# Patient Record
Sex: FEMALE | Race: WHITE | NOT HISPANIC OR LATINO | Employment: OTHER | URBAN - METROPOLITAN AREA
[De-identification: names, ages, dates, MRNs, and addresses within clinical notes are randomized per-mention and may not be internally consistent; named-entity substitution may affect disease eponyms.]

---

## 2019-01-10 ENCOUNTER — TRANSFERRED RECORDS (OUTPATIENT)
Dept: HEALTH INFORMATION MANAGEMENT | Facility: CLINIC | Age: 64
End: 2019-01-10

## 2019-08-15 ENCOUNTER — TRANSFERRED RECORDS (OUTPATIENT)
Dept: HEALTH INFORMATION MANAGEMENT | Facility: CLINIC | Age: 64
End: 2019-08-15

## 2019-08-20 ENCOUNTER — TRANSFERRED RECORDS (OUTPATIENT)
Dept: HEALTH INFORMATION MANAGEMENT | Facility: CLINIC | Age: 64
End: 2019-08-20
Payer: COMMERCIAL

## 2020-01-21 ENCOUNTER — TRANSFERRED RECORDS (OUTPATIENT)
Dept: HEALTH INFORMATION MANAGEMENT | Facility: CLINIC | Age: 65
End: 2020-01-21

## 2020-02-26 ENCOUNTER — TRANSFERRED RECORDS (OUTPATIENT)
Dept: HEALTH INFORMATION MANAGEMENT | Facility: CLINIC | Age: 65
End: 2020-02-26

## 2020-03-03 ENCOUNTER — TRANSFERRED RECORDS (OUTPATIENT)
Dept: HEALTH INFORMATION MANAGEMENT | Facility: CLINIC | Age: 65
End: 2020-03-03

## 2021-01-19 ENCOUNTER — TRANSFERRED RECORDS (OUTPATIENT)
Dept: HEALTH INFORMATION MANAGEMENT | Facility: CLINIC | Age: 66
End: 2021-01-19

## 2021-01-19 LAB
ALBUMIN (URINE) MG/SPEC: 7.9 MG/L
ALBUMIN/CREATININE RATIO: 11 MG/G CREAT
ALT SERPL-CCNC: 27 IU/L (ref 8–45)
AST SERPL-CCNC: 34 IU/L (ref 2–40)
CHOLEST SERPL-MCNC: 185 MG/DL (ref 100–199)
CREAT SERPL-MCNC: 0.64 MG/DL (ref 0.57–1.11)
CREATININE (URINE): 0.72 G/L
GFR SERPL CREATININE-BSD FRML MDRD: >60 ML/MIN/1.73M2
GLUCOSE SERPL-MCNC: 76 MG/DL (ref 65–100)
HBA1C MFR BLD: 6.3 % (ref 0–5.7)
HDLC SERPL-MCNC: 47 MG/DL
LDLC SERPL CALC-MCNC: 124 MG/DL
NONHDLC SERPL-MCNC: 138 MG/DL
POTASSIUM SERPL-SCNC: 3.9 MMOL/L (ref 3.5–5)
TRIGL SERPL-MCNC: 68 MG/DL

## 2021-01-20 ENCOUNTER — TRANSFERRED RECORDS (OUTPATIENT)
Dept: HEALTH INFORMATION MANAGEMENT | Facility: CLINIC | Age: 66
End: 2021-01-20

## 2021-05-11 ENCOUNTER — TRANSFERRED RECORDS (OUTPATIENT)
Dept: HEALTH INFORMATION MANAGEMENT | Facility: CLINIC | Age: 66
End: 2021-05-11

## 2021-05-26 ENCOUNTER — TRANSFERRED RECORDS (OUTPATIENT)
Dept: HEALTH INFORMATION MANAGEMENT | Facility: CLINIC | Age: 66
End: 2021-05-26

## 2021-06-08 ENCOUNTER — MEDICAL CORRESPONDENCE (OUTPATIENT)
Dept: HEALTH INFORMATION MANAGEMENT | Facility: CLINIC | Age: 66
End: 2021-06-08

## 2021-06-09 ENCOUNTER — REFERRAL (OUTPATIENT)
Dept: TRANSPLANT | Facility: CLINIC | Age: 66
End: 2021-06-09

## 2021-06-09 DIAGNOSIS — K75.81 NASH (NONALCOHOLIC STEATOHEPATITIS): Primary | ICD-10-CM

## 2021-06-09 NOTE — LETTER
Chantelle Sutton  [address] [place] [address]                June 11, 2021          MEDICAL RECORDS REQUEST  [facility] liver transplant team is requesting records from Referring Providers Office for patients referred to the Liver Transplant Program                Images Needed to Process Intake of Patient:    CXR Images (most recent)    Chest CT Images (all within last 24 months or most recent)    Abdominal CT Report and Images  (all within last 24 months or most recent)    Abdominal MRI Report and Images  (all within last 24 months or most recent)    Bone Scan Images and Report (No DEXA Scans)    PET Images and Report  Records Needed to Process Intake of Patient:    Cardiac Catheterization Results (most recent on file)    Chest CT Report (all within last 24 months or most recent)    Chest X-Ray Report (all within last 24 months or most recent)    Culture Results (last 2 years on file)    ECHO Results (most recent on file)    Hospital Discharge Summaries (last 2 years on file)    Lab Results (most recent on file)    History and Physical (original on file)    Liver Pathology All Reports     Physicians Notes (last 3 reports on file)    Radiology Reports (not including Chest X-ray, last 2 years on file)    EGD Images and Report     Colonoscopy Report with Pathology    Requested imaging may be electronically sent to the [employer] imaging system via [employer] DICOM connection.   When unable to send imaging electronically, an exported DICOM CD may be sent. Please indicate when images have been sent electronically on a return faxed cover sheet.    Requested pathology slides should be accompanied by the appropriate report from your institution.    When the patient is hand carrying requested records or the requested records are not at your facility, please indicate this information on a return faxed cover sheet.    Please fax all paper records to [phone] within 3-5 business days.     Please send all scans/slides to:   [facility]  Solid Organ Transplant Office  [address] [facility], [address] [address]    Please call our office at [phone] if you have any questions or concerns.

## 2021-06-11 VITALS — WEIGHT: 260 LBS | HEIGHT: 65 IN | BODY MASS INDEX: 43.32 KG/M2

## 2021-06-11 PROBLEM — K75.81 NASH (NONALCOHOLIC STEATOHEPATITIS): Status: ACTIVE | Noted: 2021-01-19

## 2021-06-11 PROBLEM — D69.6 THROMBOCYTOPENIA (H): Status: ACTIVE | Noted: 2021-01-19

## 2021-06-11 PROBLEM — E11.9 CONTROLLED TYPE 2 DIABETES MELLITUS WITHOUT COMPLICATION, WITH LONG-TERM CURRENT USE OF INSULIN (H): Status: ACTIVE | Noted: 2021-01-19

## 2021-06-11 PROBLEM — Z79.4 CONTROLLED TYPE 2 DIABETES MELLITUS WITHOUT COMPLICATION, WITH LONG-TERM CURRENT USE OF INSULIN (H): Status: ACTIVE | Noted: 2021-01-19

## 2021-06-11 PROBLEM — Z90.710 H/O TOTAL HYSTERECTOMY: Status: ACTIVE | Noted: 2021-01-19

## 2021-06-11 SDOH — HEALTH STABILITY: MENTAL HEALTH: HOW OFTEN DO YOU HAVE 6 OR MORE DRINKS ON ONE OCCASION?: NOT ASKED

## 2021-06-11 SDOH — HEALTH STABILITY: MENTAL HEALTH: HOW MANY STANDARD DRINKS CONTAINING ALCOHOL DO YOU HAVE ON A TYPICAL DAY?: NOT ASKED

## 2021-06-11 SDOH — HEALTH STABILITY: MENTAL HEALTH: HOW OFTEN DO YOU HAVE A DRINK CONTAINING ALCOHOL?: NOT ASKED

## 2021-06-11 ASSESSMENT — MIFFLIN-ST. JEOR: SCORE: 1717.29

## 2021-06-11 NOTE — TELEPHONE ENCOUNTER
Patient was asked the following questions during liver intake call.     Referring Provider: Dr. Kellie Breen   Referring Diagnosis: DINH/Cirrhosis of the Liver   PCP: Dr. Kellie Breen     1)Do you know why you have liver disease: Yes             If Alcoholic Cirrhosis is present when was your last drink: 34+ Years Ago              Have you ever been through treatment for alcohol: No  2) Presence of Ascites: Yes Paracentesis: No  3) Presence of Hepatic Encephalopathy: No Medications: No  4) History of GI Bleeding: Yes  5) Oxygen Use: None  6) EGD: Yes Where:  [facility] When: 2020  7) Colonoscopy: No  8) MELD Score: No recent labs   9) Insurance information: [employer]/Medicare       Policy ma: Self       Subscriber/policy/ID number: [id number]      Group Number: [id number]    Referral intake process completed.  Patient is aware that after financial approval is received, medical records will be requested.   Patient confirmed for a callback from transplant coordinator on 6/17/21.  Tentative evaluation date TBD.    Confirmed coordinator will discuss evaluation process in more detail at the time of their call.   Patient is aware of the need to arrange age appropriate cancer screening, vaccinations, and dental care.  Reminded patient to complete questionnaire, complete medical records release, and review packet prior to evaluation visit .  Assessed patient for special needs (ie--wheelchair, assistance, guardian, and ):  Sometimes uses a cane.   Patient instructed to call [phone] with questions.     Patient gave verbal consent during intake call to obtain medical records and documents outside of [facility]:  Yes     LA Moran, LPN   Solid Organ Transplant

## 2021-06-21 ENCOUNTER — DOCUMENTATION ONLY (OUTPATIENT)
Dept: TRANSPLANT | Facility: CLINIC | Age: 66
End: 2021-06-21

## 2021-06-21 DIAGNOSIS — K74.60 CIRRHOSIS OF LIVER (H): Primary | ICD-10-CM

## 2021-06-21 DIAGNOSIS — R93.89 ABNORMAL FINDINGS ON DIAGNOSTIC IMAGING OF OTHER SPECIFIED BODY STRUCTURES: ICD-10-CM

## 2021-06-21 DIAGNOSIS — Z11.59 ENCOUNTER FOR SCREENING FOR OTHER VIRAL DISEASES: ICD-10-CM

## 2021-06-21 NOTE — Clinical Note
Please schedule consult with hepatology with labs and ultrasound before appt same day- appointment needs to be scheduled between 7/9 and 7/29 as patient is going to be alternating between [place] and [place] for a short period of time; put orders under Linda Burton since she has most availability, but if this is an issue let me know and we can schedule under someone else.. Thanks!

## 2021-06-22 NOTE — PROGRESS NOTES
LIVER DISEASE HERMOSILLO  REFERRING PROVIDER Dr. Kellie Jay, PCP  [facility]  -----------------------------------------------------------------------------------------------------------------------------  MELD 14 >>9  Last labs 6/18/21      ABO unknown    HISTORY OF LIVER DISEASE  History of elevated liver enzymes, dx with HERMOSILLO 4-5 yrs ago but never any sx/ had no issues until hospitalized in [place] while living temporarily there due to family member illness. Hospitalized due to hematemesis May 2021; was following q 6 mos with GI in [place] prior to this pre-covid, then not again until [place] hositalization; Had EGD and banding done at that time- has follow up with GI when returns to [place] at end of June.     Ascites  Mild ascites, dx and therapeutic ascites planned 7/7 by PCP;  On Furosemide;   TIPSno  HE no  Kidney function WNL  Variceal screening see note above  HCC Screening ultrasound in [place], will obtain records  Alcohol use None since [place]os[place] dx    Hospitalizations see ntoe above, first hospitalization for liver cirrhosis mid-May  ---------------------------------------------------------------------------------------------------------------------------------  PMH  DM2, on insulin and metformin; dx with new murmur, referred to cards for echo with bubble study in early July    SHX   lives with spouse.  [place] alternating between [place] and [place], plans to move her sister from [place] up here to [place]- date TBD;  Current residence in [place], [place] expires 7/31, will be staying in [place] while sister recovers from illness with eventual plan to move back to [place] as soon as housing established in [place].  Has follow up EGD to eval banding with GI in [place] at end of June. Adult son also live in [place].   ---------------------------------------------------------------------------------------------------------------------------------  LDLT Discussed NO    PLAN  Consult with Hepatology.  Patient had episode of recent  hospialization with hematemesis, currently experiencing no sx and has follow up with GI in [place]. Repeated labs here locally and MELD down to 9.  Plans to permanently move back to [place], but date to be determined.  WIll be in [place] 7/9-7/31, will plan consult in that time frame.

## 2021-06-24 NOTE — TELEPHONE ENCOUNTER
RECORDS RECEIVED FROM: Internal   Appt Date: 07.09.2021   NOTES STATUS DETAILS   OFFICE NOTE from referring provider Internal 06.21.2021 Marva Rao RN   OFFICE NOTES from other specialists N/A    DISCHARGE SUMMARY from hospital N/A    MEDICATION LIST Care Everywhere    LIVER BIOSPY (IF APPLICABLE)      PATHOLOGY REPORTS  N/A    IMAGING     ENDOSCOPY (IF AVAILABLE) N/A    COLONOSCOPY (IF AVAILABLE) N/A    ULTRASOUND LIVER Care Everywhere 06.15.2021 US ABDOMEN COMPLETE      01.20.2021 US ABDOMEN LIMITED     CT OF ABDOMEN N/A    MRI OF LIVER N/A    FIBROSCAN, US ELASTOGRAPHY, FIBROSIS SCAN, MR ELASTOGRAPHY N/A    LABS     HEPATIC PANEL (LIVER PANEL) Care Everywhere 06.17.2021   BASIC METABOLIC PANEL Care Everywhere 06.17.2021   COMPLETE METABOLIC PANEL Care Everywhere 06.17.2021   COMPLETE BLOOD COUNT (CBC) Care Everywhere 06.17.2021   INTERNATIONAL NORMALIZED RATIO (INR) Care Everywhere 06.17.2021   HEPATITIS C ANTIBODY N/A    HEPATITIS C VIRAL LOAD/PCR N/A    HEPATITIS C GENOTYPE N/A    HEPATITIS B SURFACE ANTIGEN N/A    HEPATITIS B SURFACE ANTIBODY N/A    HEPATITIS B DNA QUANT LEVEL N/A    HEPATITIS B CORE ANTIBODY N/A      Action 06.24.2021 RM   Action Taken Called Padmini to get images pushed, called [phone], pending.     Action 06.25.2021 RM   Action Taken Images received and uploaded to chart.

## 2021-06-27 ENCOUNTER — HEALTH MAINTENANCE LETTER (OUTPATIENT)
Age: 66
End: 2021-06-27

## 2021-07-08 NOTE — H&P (VIEW-ONLY)
"[facility] New Patient Visit    Date of Visit: July 9, 2021    Reason for referral: Consult for decompensated DINH cirrhosis    Subjective: Ms. 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     She was first diagnosed with DINH based on elevated LFTs 4-5 years ago, was referred to GI (Dr. Lynn - [facility]). Went on milk thistle and vitamin E. Reported some scarring on imaging, not sure if she was told she had cirrhosis, but underwent HCC screening every 6 months. Never had a liver bx.     She was in [place] May 2021, felt nauseated and developed hematemesis - underwent EGD that showed EVs that were banded x 6 and GVs that did not have stigmata of bleeding. She had a CT scan at that time - may have had ascites but not enough to tap. MELD apparently 15.     She returned back to [place].     Was started on lasix 20 mg daily a few weeks ago. She underwent her first paracentesis 7/7 - 2 L removed, she is not sure if there was more to remove. Not sent for diagnostic sampling.     Reports she is due for a colonoscopy, has a history of polyps. Last one was 5 years ago.     She is having knee issues, thinks she may need a knee replacement soon.     Weights 251 lbs, was 270 lbs earlier this year.     ROS: 14 point ROS negative except for positives noted in HPI.    PMHx:  Past Medical History:   Diagnosis Date     Diabetes (H)      Hepatitis    DM on insulin and metformin    PSHx:  C section   JACOB - tubes and ovaries removed, has her cervix left    FamHx:  Brother was a drink with cirrhosis, pancreatic cancer  No family history of liver disease, liver cancer    SocHx:  Social History     Socioeconomic History     Marital status:      Spouse name: Not on file     Number of children: Not on file     Years of education: Not on file     Highest education level: Not on file "   Occupational History     Not on file   Social Needs     Financial resource strain: Not on file     Food insecurity     Worry: Not on file     Inability: Not on file     Transportation needs     Medical: Not on file     Non-medical: Not on file   Tobacco Use     Smoking status: Never Smoker     Smokeless tobacco: Never Used   Substance and Sexual Activity     Alcohol use: Not Currently     Comment: Last drink was 30+ years ago     Drug use: Never     Sexual activity: Not on file   Lifestyle     Physical activity     Days per week: Not on file     Minutes per session: Not on file     Stress: Not on file   Relationships     Social connections     Talks on phone: Not on file     Gets together: Not on file     Attends [employer] service: Not on file     Active member of club or organization: Not on file     Attends meetings of clubs or organizations: Not on file     Relationship status: Not on file     Intimate partner violence     Fear of current or ex partner: Not on file     Emotionally abused: Not on file     Physically abused: Not on file     Forced sexual activity: Not on file   Other Topics Concern     Parent/sibling w/ CABG, MI or angioplasty before 65F 55M? Not Asked   Social History Narrative     Not on file    lives with spouse.  [place] and [place], plans to move her sister from [place] up here to [place]- date D when they buy a house;  Current residence in [place], lease expires 7/31, will be staying in [place] while sister recovers from illness with eventual plan to move back to [place] as soon as housing established in [place].    Rare alcohol use in the past    Medications:  Current Outpatient Medications   Medication     aspirin (ASA) 81 MG chewable tablet     biotin 2.5 MG CAPS     blood glucose (ONETOUCH VERIO IQ) test strip     Blood Glucose Monitoring Suppl (ACCU-CHEK GUIDE) w/Device KIT     furosemide (LASIX) 20 MG tablet     insulin pen needle (B-D U/F) 31G X 5 MM miscellaneous     metFORMIN  "(GLUCOPHAGE) 1000 MG tablet     nadolol (CORGARD) 20 MG tablet     pantoprazole (PROTONIX) 40 MG EC tablet     spironolactone (ALDACTONE) 25 MG tablet     ACCU-CHEK GUIDE test strip     B-D U/F insulin pen needle     Blood Glucose Monitoring Suppl (ACCU-CHEK GUIDE) w/Device KIT     cholecalciferol (VITAMIN D3) 1250 mcg (15559 units) capsule     LANTUS SOLOSTAR 100 UNIT/ML soln     No current facility-administered medications for this visit.    Lasix 20 mg daily - started a few weeks ago for LE edema and abdominal swelling  PPI for GERD  Blood sugars ok, A1c good    Allergies:  Allergies   Allergen Reactions     Doxycycline Diarrhea       Objective:  /74   Pulse 64   Temp 97.7  F (36.5  C) (Oral)   Ht 1.638 m (5' 4.5\")   Wt 114.1 kg (251 lb 9.6 oz)   SpO2 97%   BMI 42.52 kg/m    Constitutional: pleasant woman in NAD  Eyes: non icteric  Respiratory: Normal respiratory excursion   MSK: normal range of motion of visualized extremities  Abd: Obese, minimal ascites present  Skin: No jaundice  Psychiatric: normal mood and orientation    Labs:  Last Comprehensive Metabolic Panel:  Sodium   Date Value Ref Range Status   07/09/2021 139 133 - 144 mmol/L Final     Potassium   Date Value Ref Range Status   07/09/2021 3.7 3.4 - 5.3 mmol/L Final     Chloride   Date Value Ref Range Status   07/09/2021 107 94 - 109 mmol/L Final     Carbon Dioxide   Date Value Ref Range Status   07/09/2021 26 20 - 32 mmol/L Final     Anion Gap   Date Value Ref Range Status   07/09/2021 6 3 - 14 mmol/L Final     Glucose   Date Value Ref Range Status   07/09/2021 181 (H) 70 - 99 mg/dL Final     Urea Nitrogen   Date Value Ref Range Status   07/09/2021 13 7 - 30 mg/dL Final     Creatinine   Date Value Ref Range Status   07/09/2021 0.58 0.52 - 1.04 mg/dL Final     GFR Estimate   Date Value Ref Range Status   07/09/2021 >90 >60 mL/min/[1.73_m2] Final     Comment:     Non  GFR Calc  Starting 12/18/2018, serum creatinine based " estimated GFR (eGFR) will be   calculated using the Chronic Kidney Disease Epidemiology Collaboration   (CKD-EPI) equation.       Calcium   Date Value Ref Range Status   07/09/2021 8.6 8.5 - 10.1 mg/dL Final     Bilirubin Total   Date Value Ref Range Status   07/09/2021 0.5 0.2 - 1.3 mg/dL Final     Alkaline Phosphatase   Date Value Ref Range Status   07/09/2021 100 40 - 150 U/L Final     ALT   Date Value Ref Range Status   07/09/2021 24 0 - 50 U/L Final     AST   Date Value Ref Range Status   07/09/2021 25 0 - 45 U/L Final       No results found for: WBC  No results found for: RBC  No results found for: HGB  No results found for: HCT  No results found for: MCV  No results found for: MCH  No results found for: MCHC  No results found for: RDW  No results found for: PLT    INR   Date Value Ref Range Status   07/09/2021 1.26 (H) 0.86 - 1.14 Final        MELD-Na score: 9 at 7/9/2021 10:49 AM  MELD score: 9 at 7/9/2021 10:49 AM  Calculated from:  Serum Creatinine: 0.58 mg/dL (Rounded to 1 mg/dL) at 7/9/2021 10:49 AM  Serum Sodium: 139 mmol/L (Rounded to 137 mmol/L) at 7/9/2021 10:49 AM  Total Bilirubin: 0.5 mg/dL (Rounded to 1 mg/dL) at 7/9/2021 10:49 AM  INR(ratio): 1.26 at 7/9/2021 10:49 AM  Age: 66 years 5 months    Imaging:     RUQ US 6/15/2021    GALLBLADDER: The gallbladder is distended with evidence of wall thickening as well as ascites creating pericholecystic fluid. Redemonstrated is a nonshadowing 4 mm polyp without stones.    BILE DUCTS: No biliary dilatation. The common duct measures 4 mm.    LIVER: Coarsened echotexture, suggesting underlying cirrhosis. Nodular contour. No focal mass.    RIGHT KIDNEY: Normal size. Normal echogenicity with no hydronephrosis or mass.     LEFT KIDNEY: Normal size. Normal echogenicity with no hydronephrosis or mass.     SPLEEN: Splenomegaly measuring 21 cm.    PANCREAS: Poorly visualized.    AORTA: Normal in caliber.     IVC: Normal where visualized.    Moderate  "ascites.    IMPRESSION:  1.  Nodular cirrhotic liver with ascites.    2.  Splenomegaly may be related to portal hypertension.    3.  Wall thickening within the gallbladder may be related to cirrhosis, ascites, and incomplete distention. Sonographic García sign is negative. Gallbladder polyp unchanged.    Endoscopy:    EGD 5/12/2021  EV banding x 6  GVs no stigmata of bleeding    Independently reviewed labs and imaging.     Assessment/Plan: Ms. 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     She has a long history of DINH cirrhosis, recently decompensated for the first time with an apparent esophageal variceal bleed and new ascites. MELD is 9. Started on lasix but required a paracentesis recently.     Discussed the natural progression of DINH cirrhosis. Recommend continued medical management of her portal HTN complications (VB and and ascites). Her MELD is only 9, given this she would only be competitive for liver transplant using living donor liver transplant. Recommend continued medical management, do not feel the risks of transplant would outweigh the benefits at this time. Discussed we will continue to evaluate this, and if her MELD rises or her liver disease decompensated, can consider liver transplant at that time. Discussed she should stay up to date with cancer screening in case we are considering liver transplant, and also should work to lose weight given her BMI > 40.     - Continue lasix 20 mg daily - start spironolactone 25 mg daily given continued ascites on lasix. Check BMP Tuesday at [facility]  - Continue nadolol for secondary ppx of variceal bleeding - HR is close to goal. Order EGD for repeat banding. Ideally would go prior to her returning to [place]. Her episode of variceal bleeding May 2021 appear to have been an esophageal variceal bleed, but she was also noted to have gastric " varices that did not have stigmata  - Discussed that she may be a candidate for TIPS if having issues with ascites intolerant to diuretics or recurrent variceal bleeding  - HCC screening - RUQ US 6/2021 without masses, AFP normal today  - Continue to abstain from alcohol, recommend weight loss given her BMI > 40 would be an issue for her transplant candidacy  - She is due for colonoscopy - can do with EGD if schedule permits, but would prioritize doing her EGD prior to going back to [place].     RTC 3 months.    Linda Burton MD MS  Hepatology/Liver Transplant  [facility]    Approximately 40 minutes was spent for the visit with 60 minutes of non face-to-face time were spent in review of the patient's medical record on the day of the visit. This included review of previous: clinic visits, hospital records, lab results, imaging studies, and procedural documentation.  The findings from this review are summarized in the above note.

## 2021-07-08 NOTE — PROGRESS NOTES
"[facility] New Patient Visit    Date of Visit: July 9, 2021    Reason for referral: Consult for decompensated DINH cirrhosis    Subjective: Ms. 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     She was first diagnosed with DINH based on elevated LFTs 4-5 years ago, was referred to GI (Dr. Lynn - [facility]). Went on milk thistle and vitamin E. Reported some scarring on imaging, not sure if she was told she had cirrhosis, but underwent HCC screening every 6 months. Never had a liver bx.     She was in [place] May 2021, felt nauseated and developed hematemesis - underwent EGD that showed EVs that were banded x 6 and GVs that did not have stigmata of bleeding. She had a CT scan at that time - may have had ascites but not enough to tap. MELD apparently 15.     She returned back to [place].     Was started on lasix 20 mg daily a few weeks ago. She underwent her first paracentesis 7/7 - 2 L removed, she is not sure if there was more to remove. Not sent for diagnostic sampling.     Reports she is due for a colonoscopy, has a history of polyps. Last one was 5 years ago.     She is having knee issues, thinks she may need a knee replacement soon.     Weights 251 lbs, was 270 lbs earlier this year.     ROS: 14 point ROS negative except for positives noted in HPI.    PMHx:  Past Medical History:   Diagnosis Date     Diabetes (H)      Hepatitis    DM on insulin and metformin    PSHx:  C section   JACOB - tubes and ovaries removed, has her cervix left    FamHx:  Brother was a drink with cirrhosis, pancreatic cancer  No family history of liver disease, liver cancer    SocHx:  Social History     Socioeconomic History     Marital status:      Spouse name: Not on file     Number of children: Not on file     Years of education: Not on file     Highest education level: Not on file "   Occupational History     Not on file   Social Needs     Financial resource strain: Not on file     Food insecurity     Worry: Not on file     Inability: Not on file     Transportation needs     Medical: Not on file     Non-medical: Not on file   Tobacco Use     Smoking status: Never Smoker     Smokeless tobacco: Never Used   Substance and Sexual Activity     Alcohol use: Not Currently     Comment: Last drink was 30+ years ago     Drug use: Never     Sexual activity: Not on file   Lifestyle     Physical activity     Days per week: Not on file     Minutes per session: Not on file     Stress: Not on file   Relationships     Social connections     Talks on phone: Not on file     Gets together: Not on file     Attends Worship service: Not on file     Active member of club or organization: Not on file     Attends meetings of clubs or organizations: Not on file     Relationship status: Not on file     Intimate partner violence     Fear of current or ex partner: Not on file     Emotionally abused: Not on file     Physically abused: Not on file     Forced sexual activity: Not on file   Other Topics Concern     Parent/sibling w/ CABG, MI or angioplasty before 65F 55M? Not Asked   Social History Narrative     Not on file    lives with spouse.  Currently alternating between [place] and [place], plans to move her sister from [place] up here to [place]- date D when they buy a house;  Current residence in [place], lease expires 7/31, will be staying in [place] while sister recovers from illness with eventual plan to move back to [place] as soon as housing established in [place].    Rare alcohol use in the past    Medications:  Current Outpatient Medications   Medication     aspirin (ASA) 81 MG chewable tablet     biotin 2.5 MG CAPS     blood glucose (ONETOUCH VERIO IQ) test strip     Blood Glucose Monitoring Suppl (ACCU-CHEK GUIDE) w/Device KIT     furosemide (LASIX) 20 MG tablet     insulin pen needle (B-D U/F) 31G X 5 MM miscellaneous     metFORMIN  "(GLUCOPHAGE) 1000 MG tablet     nadolol (CORGARD) 20 MG tablet     pantoprazole (PROTONIX) 40 MG EC tablet     spironolactone (ALDACTONE) 25 MG tablet     ACCU-CHEK GUIDE test strip     B-D U/F insulin pen needle     Blood Glucose Monitoring Suppl (ACCU-CHEK GUIDE) w/Device KIT     cholecalciferol (VITAMIN D3) 1250 mcg (97460 units) capsule     LANTUS SOLOSTAR 100 UNIT/ML soln     No current facility-administered medications for this visit.    Lasix 20 mg daily - started a few weeks ago for LE edema and abdominal swelling  PPI for GERD  Blood sugars ok, A1c good    Allergies:  Allergies   Allergen Reactions     Doxycycline Diarrhea       Objective:  /74   Pulse 64   Temp 97.7  F (36.5  C) (Oral)   Ht 1.638 m (5' 4.5\")   Wt 114.1 kg (251 lb 9.6 oz)   SpO2 97%   BMI 42.52 kg/m    Constitutional: pleasant woman in NAD  Eyes: non icteric  Respiratory: Normal respiratory excursion   MSK: normal range of motion of visualized extremities  Abd: Obese, minimal ascites present  Skin: No jaundice  Psychiatric: normal mood and orientation    Labs:  Last Comprehensive Metabolic Panel:  Sodium   Date Value Ref Range Status   07/09/2021 139 133 - 144 mmol/L Final     Potassium   Date Value Ref Range Status   07/09/2021 3.7 3.4 - 5.3 mmol/L Final     Chloride   Date Value Ref Range Status   07/09/2021 107 94 - 109 mmol/L Final     Carbon Dioxide   Date Value Ref Range Status   07/09/2021 26 20 - 32 mmol/L Final     Anion Gap   Date Value Ref Range Status   07/09/2021 6 3 - 14 mmol/L Final     Glucose   Date Value Ref Range Status   07/09/2021 181 (H) 70 - 99 mg/dL Final     Urea Nitrogen   Date Value Ref Range Status   07/09/2021 13 7 - 30 mg/dL Final     Creatinine   Date Value Ref Range Status   07/09/2021 0.58 0.52 - 1.04 mg/dL Final     GFR Estimate   Date Value Ref Range Status   07/09/2021 >90 >60 mL/min/[1.73_m2] Final     Comment:     Non  GFR Calc  Starting 12/18/2018, serum creatinine based " estimated GFR (eGFR) will be   calculated using the Chronic Kidney Disease Epidemiology Collaboration   (CKD-EPI) equation.       Calcium   Date Value Ref Range Status   07/09/2021 8.6 8.5 - 10.1 mg/dL Final     Bilirubin Total   Date Value Ref Range Status   07/09/2021 0.5 0.2 - 1.3 mg/dL Final     Alkaline Phosphatase   Date Value Ref Range Status   07/09/2021 100 40 - 150 U/L Final     ALT   Date Value Ref Range Status   07/09/2021 24 0 - 50 U/L Final     AST   Date Value Ref Range Status   07/09/2021 25 0 - 45 U/L Final       No results found for: WBC  No results found for: RBC  No results found for: HGB  No results found for: HCT  No results found for: MCV  No results found for: MCH  No results found for: MCHC  No results found for: RDW  No results found for: PLT    INR   Date Value Ref Range Status   07/09/2021 1.26 (H) 0.86 - 1.14 Final        MELD-Na score: 9 at 7/9/2021 10:49 AM  MELD score: 9 at 7/9/2021 10:49 AM  Calculated from:  Serum Creatinine: 0.58 mg/dL (Rounded to 1 mg/dL) at 7/9/2021 10:49 AM  Serum Sodium: 139 mmol/L (Rounded to 137 mmol/L) at 7/9/2021 10:49 AM  Total Bilirubin: 0.5 mg/dL (Rounded to 1 mg/dL) at 7/9/2021 10:49 AM  INR(ratio): 1.26 at 7/9/2021 10:49 AM  Age: 66 years 5 months    Imaging:     RUQ US 6/15/2021    GALLBLADDER: The gallbladder is distended with evidence of wall thickening as well as ascites creating pericholecystic fluid. Redemonstrated is a nonshadowing 4 mm polyp without stones.    BILE DUCTS: No biliary dilatation. The common duct measures 4 mm.    LIVER: Coarsened echotexture, suggesting underlying cirrhosis. Nodular contour. No focal mass.    RIGHT KIDNEY: Normal size. Normal echogenicity with no hydronephrosis or mass.     LEFT KIDNEY: Normal size. Normal echogenicity with no hydronephrosis or mass.     SPLEEN: Splenomegaly measuring 21 cm.    PANCREAS: Poorly visualized.    AORTA: Normal in caliber.     IVC: Normal where visualized.    Moderate  "ascites.    IMPRESSION:  1.  Nodular cirrhotic liver with ascites.    2.  Splenomegaly may be related to portal hypertension.    3.  Wall thickening within the gallbladder may be related to cirrhosis, ascites, and incomplete distention. Sonographic García sign is negative. Gallbladder polyp unchanged.    Endoscopy:    EGD 5/12/2021  EV banding x 6  GVs no stigmata of bleeding    Independently reviewed labs and imaging.     Assessment/Plan: Ms. 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     She has a long history of DINH cirrhosis, recently decompensated for the first time with an apparent esophageal variceal bleed and new ascites. MELD is 9. Started on lasix but required a paracentesis recently.     Discussed the natural progression of DINH cirrhosis. Recommend continued medical management of her portal HTN complications (VB and and ascites). Her MELD is only 9, given this she would only be competitive for liver transplant using living donor liver transplant. Recommend continued medical management, do not feel the risks of transplant would outweigh the benefits at this time. Discussed we will continue to evaluate this, and if her MELD rises or her liver disease decompensated, can consider liver transplant at that time. Discussed she should stay up to date with cancer screening in case we are considering liver transplant, and also should work to lose weight given her BMI > 40.     - Continue lasix 20 mg daily - start spironolactone 25 mg daily given continued ascites on lasix. Check BMP Tuesday at [facility]  - Continue nadolol for secondary ppx of variceal bleeding - HR is close to goal. Order EGD for repeat banding. Ideally would go prior to her returning to [place]. Her episode of variceal bleeding May 2021 appear to have been an esophageal variceal bleed, but she was also noted to have gastric " varices that did not have stigmata  - Discussed that she may be a candidate for TIPS if having issues with ascites intolerant to diuretics or recurrent variceal bleeding  - HCC screening - RUQ US 6/2021 without masses, AFP normal today  - Continue to abstain from alcohol, recommend weight loss given her BMI > 40 would be an issue for her transplant candidacy  - She is due for colonoscopy - can do with EGD if schedule permits, but would prioritize doing her EGD prior to going back to [place].     RTC 3 months.    Linda Burton MD MS  Hepatology/Liver Transplant  [facility]    Approximately 40 minutes was spent for the visit with 60 minutes of non face-to-face time were spent in review of the patient's medical record on the day of the visit. This included review of previous: clinic visits, hospital records, lab results, imaging studies, and procedural documentation.  The findings from this review are summarized in the above note.

## 2021-07-09 ENCOUNTER — PRE VISIT (OUTPATIENT)
Dept: GASTROENTEROLOGY | Facility: CLINIC | Age: 66
End: 2021-07-09

## 2021-07-09 ENCOUNTER — OFFICE VISIT (OUTPATIENT)
Dept: GASTROENTEROLOGY | Facility: CLINIC | Age: 66
End: 2021-07-09
Attending: STUDENT IN AN ORGANIZED HEALTH CARE EDUCATION/TRAINING PROGRAM
Payer: COMMERCIAL

## 2021-07-09 VITALS
OXYGEN SATURATION: 97 % | DIASTOLIC BLOOD PRESSURE: 74 MMHG | HEIGHT: 65 IN | HEART RATE: 64 BPM | WEIGHT: 251.6 LBS | TEMPERATURE: 97.7 F | SYSTOLIC BLOOD PRESSURE: 129 MMHG | BODY MASS INDEX: 41.92 KG/M2

## 2021-07-09 DIAGNOSIS — K74.60 CIRRHOSIS OF LIVER WITH ASCITES, UNSPECIFIED HEPATIC CIRRHOSIS TYPE (H): Primary | ICD-10-CM

## 2021-07-09 DIAGNOSIS — R18.8 OTHER ASCITES: ICD-10-CM

## 2021-07-09 DIAGNOSIS — R18.8 CIRRHOSIS OF LIVER WITH ASCITES, UNSPECIFIED HEPATIC CIRRHOSIS TYPE (H): Primary | ICD-10-CM

## 2021-07-09 DIAGNOSIS — Z11.59 ENCOUNTER FOR SCREENING FOR OTHER VIRAL DISEASES: ICD-10-CM

## 2021-07-09 DIAGNOSIS — R93.89 ABNORMAL FINDINGS ON DIAGNOSTIC IMAGING OF OTHER SPECIFIED BODY STRUCTURES: ICD-10-CM

## 2021-07-09 DIAGNOSIS — I85.11 ESOPHAGEAL VARICES WITH BLEEDING IN DISEASES CLASSIFIED ELSEWHERE (H): ICD-10-CM

## 2021-07-09 DIAGNOSIS — K74.60 CIRRHOSIS OF LIVER (H): ICD-10-CM

## 2021-07-09 DIAGNOSIS — E66.01 MORBID OBESITY (H): ICD-10-CM

## 2021-07-09 LAB
ABO + RH BLD: NORMAL
AFP SERPL-MCNC: <1.5 UG/L (ref 0–8)
ALBUMIN SERPL-MCNC: 3 G/DL (ref 3.4–5)
ALBUMIN UR-MCNC: NEGATIVE MG/DL
ALP SERPL-CCNC: 100 U/L (ref 40–150)
ALT SERPL W P-5'-P-CCNC: 24 U/L (ref 0–50)
ANION GAP SERPL CALCULATED.3IONS-SCNC: 6 MMOL/L (ref 3–14)
APPEARANCE UR: ABNORMAL
AST SERPL W P-5'-P-CCNC: 25 U/L (ref 0–45)
BILIRUB DIRECT SERPL-MCNC: 0.2 MG/DL (ref 0–0.2)
BILIRUB SERPL-MCNC: 0.5 MG/DL (ref 0.2–1.3)
BILIRUB UR QL STRIP: NEGATIVE
BLD GP AB SCN SERPL QL: NORMAL
BLOOD BANK CMNT PATIENT-IMP: NORMAL
BUN SERPL-MCNC: 13 MG/DL (ref 7–30)
CALCIUM SERPL-MCNC: 8.6 MG/DL (ref 8.5–10.1)
CHLORIDE SERPL-SCNC: 107 MMOL/L (ref 94–109)
CMV IGG SERPL QL IA: 0.2 AI (ref 0–0.8)
CO2 SERPL-SCNC: 26 MMOL/L (ref 20–32)
COLOR UR AUTO: YELLOW
CREAT SERPL-MCNC: 0.58 MG/DL (ref 0.52–1.04)
DEPRECATED CALCIDIOL+CALCIFEROL SERPL-MC: 40 UG/L (ref 20–75)
EBV VCA IGG SER QL IA: 6.8 AI (ref 0–0.8)
ERYTHROCYTE [DISTWIDTH] IN BLOOD BY AUTOMATED COUNT: 15.6 % (ref 10–15)
FERRITIN SERPL-MCNC: 10 NG/ML (ref 8–252)
GFR SERPL CREATININE-BSD FRML MDRD: >90 ML/MIN/{1.73_M2}
GLUCOSE SERPL-MCNC: 181 MG/DL (ref 70–99)
GLUCOSE UR STRIP-MCNC: NEGATIVE MG/DL
HAV IGG SER QL IA: NONREACTIVE
HBV CORE AB SERPL QL IA: NONREACTIVE
HBV SURFACE AB SERPL IA-ACNC: 86.07 M[IU]/ML
HBV SURFACE AG SERPL QL IA: NONREACTIVE
HCT VFR BLD AUTO: 31.4 % (ref 35–47)
HCV AB SERPL QL IA: NONREACTIVE
HGB BLD-MCNC: 10 G/DL (ref 11.7–15.7)
HGB UR QL STRIP: NEGATIVE
HIV 1+2 AB+HIV1 P24 AG SERPL QL IA: NONREACTIVE
INR PPP: 1.26 (ref 0.86–1.14)
IRON SATN MFR SERPL: 11 % (ref 15–46)
IRON SERPL-MCNC: 40 UG/DL (ref 35–180)
KETONES UR STRIP-MCNC: NEGATIVE MG/DL
LEUKOCYTE ESTERASE UR QL STRIP: NEGATIVE
MCH RBC QN AUTO: 27.2 PG (ref 26.5–33)
MCHC RBC AUTO-ENTMCNC: 31.8 G/DL (ref 31.5–36.5)
MCV RBC AUTO: 85 FL (ref 78–100)
MUCOUS THREADS #/AREA URNS LPF: PRESENT /LPF
NITRATE UR QL: NEGATIVE
PH UR STRIP: 5 PH (ref 5–7)
PHOSPHATE SERPL-MCNC: 3.2 MG/DL (ref 2.5–4.5)
PLATELET # BLD AUTO: 77 10E9/L (ref 150–450)
POTASSIUM SERPL-SCNC: 3.7 MMOL/L (ref 3.4–5.3)
PROT SERPL-MCNC: 8 G/DL (ref 6.8–8.8)
RBC # BLD AUTO: 3.68 10E12/L (ref 3.8–5.2)
RBC #/AREA URNS AUTO: 1 /HPF (ref 0–2)
SODIUM SERPL-SCNC: 139 MMOL/L (ref 133–144)
SOURCE: ABNORMAL
SP GR UR STRIP: 1.01 (ref 1–1.03)
SPECIMEN EXP DATE BLD: NORMAL
SPECIMEN EXP DATE BLD: NORMAL
SQUAMOUS #/AREA URNS AUTO: <1 /HPF (ref 0–1)
T PALLIDUM AB SER QL: NONREACTIVE
TIBC SERPL-MCNC: 379 UG/DL (ref 240–430)
TRANSFERRIN SERPL-MCNC: 306 MG/DL (ref 210–360)
TSH SERPL DL<=0.005 MIU/L-ACNC: 3.39 MU/L (ref 0.4–4)
UROBILINOGEN UR STRIP-MCNC: 0 MG/DL (ref 0–2)
WBC # BLD AUTO: 3.9 10E9/L (ref 4–11)
WBC #/AREA URNS AUTO: 5 /HPF (ref 0–5)

## 2021-07-09 PROCEDURE — 84466 ASSAY OF TRANSFERRIN: CPT | Performed by: PATHOLOGY

## 2021-07-09 PROCEDURE — 85027 COMPLETE CBC AUTOMATED: CPT | Performed by: PATHOLOGY

## 2021-07-09 PROCEDURE — 86665 EPSTEIN-BARR CAPSID VCA: CPT | Performed by: PATHOLOGY

## 2021-07-09 PROCEDURE — 87340 HEPATITIS B SURFACE AG IA: CPT | Performed by: PATHOLOGY

## 2021-07-09 PROCEDURE — 83540 ASSAY OF IRON: CPT | Performed by: PATHOLOGY

## 2021-07-09 PROCEDURE — 86481 TB AG RESPONSE T-CELL SUSP: CPT | Performed by: PATHOLOGY

## 2021-07-09 PROCEDURE — 86704 HEP B CORE ANTIBODY TOTAL: CPT | Performed by: PATHOLOGY

## 2021-07-09 PROCEDURE — 86780 TREPONEMA PALLIDUM: CPT | Performed by: PATHOLOGY

## 2021-07-09 PROCEDURE — 86850 RBC ANTIBODY SCREEN: CPT | Performed by: PATHOLOGY

## 2021-07-09 PROCEDURE — 82306 VITAMIN D 25 HYDROXY: CPT | Performed by: PATHOLOGY

## 2021-07-09 PROCEDURE — 86900 BLOOD TYPING SEROLOGIC ABO: CPT | Performed by: PATHOLOGY

## 2021-07-09 PROCEDURE — 80076 HEPATIC FUNCTION PANEL: CPT | Performed by: PATHOLOGY

## 2021-07-09 PROCEDURE — 99205 OFFICE O/P NEW HI 60 MIN: CPT | Performed by: STUDENT IN AN ORGANIZED HEALTH CARE EDUCATION/TRAINING PROGRAM

## 2021-07-09 PROCEDURE — 86901 BLOOD TYPING SEROLOGIC RH(D): CPT | Performed by: PATHOLOGY

## 2021-07-09 PROCEDURE — 84100 ASSAY OF PHOSPHORUS: CPT | Performed by: PATHOLOGY

## 2021-07-09 PROCEDURE — 80048 BASIC METABOLIC PNL TOTAL CA: CPT | Performed by: PATHOLOGY

## 2021-07-09 PROCEDURE — 82728 ASSAY OF FERRITIN: CPT | Performed by: PATHOLOGY

## 2021-07-09 PROCEDURE — 86708 HEPATITIS A ANTIBODY: CPT | Performed by: PATHOLOGY

## 2021-07-09 PROCEDURE — 85610 PROTHROMBIN TIME: CPT | Performed by: PATHOLOGY

## 2021-07-09 PROCEDURE — 82105 ALPHA-FETOPROTEIN SERUM: CPT | Performed by: PATHOLOGY

## 2021-07-09 PROCEDURE — 99417 PROLNG OP E/M EACH 15 MIN: CPT | Performed by: STUDENT IN AN ORGANIZED HEALTH CARE EDUCATION/TRAINING PROGRAM

## 2021-07-09 PROCEDURE — 86706 HEP B SURFACE ANTIBODY: CPT | Mod: 90 | Performed by: PATHOLOGY

## 2021-07-09 PROCEDURE — 84443 ASSAY THYROID STIM HORMONE: CPT | Performed by: PATHOLOGY

## 2021-07-09 PROCEDURE — 86644 CMV ANTIBODY: CPT | Performed by: PATHOLOGY

## 2021-07-09 PROCEDURE — G0463 HOSPITAL OUTPT CLINIC VISIT: HCPCS

## 2021-07-09 PROCEDURE — 36415 COLL VENOUS BLD VENIPUNCTURE: CPT | Performed by: PATHOLOGY

## 2021-07-09 PROCEDURE — 80321 ALCOHOLS BIOMARKERS 1OR 2: CPT | Performed by: PATHOLOGY

## 2021-07-09 PROCEDURE — 81001 URINALYSIS AUTO W/SCOPE: CPT | Mod: 59 | Performed by: PATHOLOGY

## 2021-07-09 PROCEDURE — 86803 HEPATITIS C AB TEST: CPT | Performed by: PATHOLOGY

## 2021-07-09 RX ORDER — BLOOD SUGAR DIAGNOSTIC
STRIP MISCELLANEOUS
COMMUNITY
Start: 2021-04-19

## 2021-07-09 RX ORDER — ASPIRIN 81 MG/1
81 TABLET, CHEWABLE ORAL
COMMUNITY
Start: 2021-01-19 | End: 2021-07-29

## 2021-07-09 RX ORDER — BLOOD SUGAR DIAGNOSTIC
STRIP MISCELLANEOUS
COMMUNITY
Start: 2021-01-19 | End: 2021-07-29

## 2021-07-09 RX ORDER — NADOLOL 20 MG/1
20 TABLET ORAL
COMMUNITY
Start: 2021-06-08

## 2021-07-09 RX ORDER — FUROSEMIDE 20 MG
20 TABLET ORAL
COMMUNITY
Start: 2021-06-08

## 2021-07-09 RX ORDER — FLURBIPROFEN SODIUM 0.3 MG/ML
SOLUTION/ DROPS OPHTHALMIC
COMMUNITY
Start: 2020-09-25

## 2021-07-09 RX ORDER — INSULIN GLARGINE 100 [IU]/ML
INJECTION, SOLUTION SUBCUTANEOUS
COMMUNITY
Start: 2021-05-25

## 2021-07-09 RX ORDER — BLOOD-GLUCOSE METER
EACH MISCELLANEOUS
COMMUNITY
Start: 2021-01-21

## 2021-07-09 RX ORDER — PANTOPRAZOLE SODIUM 40 MG/1
40 TABLET, DELAYED RELEASE ORAL
COMMUNITY
Start: 2021-07-04

## 2021-07-09 RX ORDER — BLOOD-GLUCOSE METER
EACH MISCELLANEOUS
COMMUNITY
Start: 2021-01-21 | End: 2021-07-29

## 2021-07-09 RX ORDER — FLURBIPROFEN SODIUM 0.3 MG/ML
SOLUTION/ DROPS OPHTHALMIC
COMMUNITY
Start: 2020-09-25 | End: 2021-07-29

## 2021-07-09 RX ORDER — CHOLECALCIFEROL (VITAMIN D3) 1250 MCG
CAPSULE ORAL
COMMUNITY
Start: 2021-02-06 | End: 2021-07-29

## 2021-07-09 RX ORDER — SPIRONOLACTONE 25 MG/1
25 TABLET ORAL DAILY
Qty: 30 TABLET | Refills: 3 | Status: SHIPPED | OUTPATIENT
Start: 2021-07-09

## 2021-07-09 ASSESSMENT — PAIN SCALES - GENERAL: PAINLEVEL: NO PAIN (0)

## 2021-07-09 ASSESSMENT — MIFFLIN-ST. JEOR: SCORE: 1679.19

## 2021-07-09 NOTE — LETTER
"    7/9/2021         RE: Chantelle Sutton  [address]  [address]  [place] [address]      [facility] New Patient Visit    Date of Visit: July 9, 2021    Reason for referral: Consult for decompensated DINH cirrhosis    Subjective: Ms. 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     She was first diagnosed with DINH based on elevated LFTs 4-5 years ago, was referred to GI (Dr. Lynn - [facility]). Went on milk thistle and vitamin E. Reported some scarring on imaging, not sure if she was told she had cirrhosis, but underwent HCC screening every 6 months. Never had a liver bx.     She was in [place] May 2021, felt nauseated and developed hematemesis - underwent EGD that showed EVs that were banded x 6 and GVs that did not have stigmata of bleeding. She had a CT scan at that time - may have had ascites but not enough to tap. MELD apparently 15.     She returned back to [place].     Was started on lasix 20 mg daily a few weeks ago. She underwent her first paracentesis 7/7 - 2 L removed, she is not sure if there was more to remove. Not sent for diagnostic sampling.     Reports she is due for a colonoscopy, has a history of polyps. Last one was 5 years ago.     She is having knee issues, thinks she may need a knee replacement soon.     Weights 251 lbs, was 270 lbs earlier this year.     ROS: 14 point ROS negative except for positives noted in HPI.    PMHx:  Past Medical History:   Diagnosis Date     Diabetes (H)      Hepatitis    DM on insulin and metformin    PSHx:  C section   JACOB - tubes and ovaries removed, has her cervix left    FamHx:  Brother was a drink with cirrhosis, pancreatic cancer  No family history of liver disease, liver cancer    SocHx:  Social History     Socioeconomic History     Marital status:      Spouse name: Not on file     Number of children: " Not on file     Years of education: Not on file     Highest education level: Not on file   Occupational History     Not on file   Social Needs     Financial resource strain: Not on file     Food insecurity     Worry: Not on file     Inability: Not on file     Transportation needs     Medical: Not on file     Non-medical: Not on file   Tobacco Use     Smoking status: Never Smoker     Smokeless tobacco: Never Used   Substance and Sexual Activity     Alcohol use: Not Currently     Comment: Last drink was 30+ years ago     Drug use: Never     Sexual activity: Not on file   Lifestyle     Physical activity     Days per week: Not on file     Minutes per session: Not on file     Stress: Not on file   Relationships     Social connections     Talks on phone: Not on file     Gets together: Not on file     Attends Zoroastrianism service: Not on file     Active member of club or organization: Not on file     Attends meetings of clubs or organizations: Not on file     Relationship status: Not on file     Intimate partner violence     Fear of current or ex partner: Not on file     Emotionally abused: Not on file     Physically abused: Not on file     Forced sexual activity: Not on file   Other Topics Concern     Parent/sibling w/ CABG, MI or angioplasty before 65F 55M? Not Asked   Social History Narrative     Not on file    lives with spouse.  Currently alternating between [place] and [place], plans to move her sister from [place] up here to [place]- date TBD when they buy a house;  Current residence in [place], lease expires 7/31, will be staying in [place] while sister recovers from illness with eventual plan to move back to [place] as soon as housing established in [place].    Rare alcohol use in the past    Medications:  Current Outpatient Medications   Medication     aspirin (ASA) 81 MG chewable tablet     biotin 2.5 MG CAPS     blood glucose (ONETOUCH VERIO IQ) test strip     Blood Glucose Monitoring Suppl (ACCU-CHEK GUIDE) w/Device KIT     furosemide (LASIX) 20  "MG tablet     insulin pen needle (B-D U/F) 31G X 5 MM miscellaneous     metFORMIN (GLUCOPHAGE) 1000 MG tablet     nadolol (CORGARD) 20 MG tablet     pantoprazole (PROTONIX) 40 MG EC tablet     spironolactone (ALDACTONE) 25 MG tablet     ACCU-CHEK GUIDE test strip     B-D U/F insulin pen needle     Blood Glucose Monitoring Suppl (ACCU-CHEK GUIDE) w/Device KIT     cholecalciferol (VITAMIN D3) 1250 mcg (55397 units) capsule     LANTUS SOLOSTAR 100 UNIT/ML soln     No current facility-administered medications for this visit.    Lasix 20 mg daily - started a few weeks ago for LE edema and abdominal swelling  PPI for GERD  Blood sugars ok, A1c good    Allergies:  Allergies   Allergen Reactions     Doxycycline Diarrhea       Objective:  /74   Pulse 64   Temp 97.7  F (36.5  C) (Oral)   Ht 1.638 m (5' 4.5\")   Wt 114.1 kg (251 lb 9.6 oz)   SpO2 97%   BMI 42.52 kg/m    Constitutional: pleasant woman in NAD  Eyes: non icteric  Respiratory: Normal respiratory excursion   MSK: normal range of motion of visualized extremities  Abd: Obese, minimal ascites present  Skin: No jaundice  Psychiatric: normal mood and orientation    Labs:  Last Comprehensive Metabolic Panel:  Sodium   Date Value Ref Range Status   07/09/2021 139 133 - 144 mmol/L Final     Potassium   Date Value Ref Range Status   07/09/2021 3.7 3.4 - 5.3 mmol/L Final     Chloride   Date Value Ref Range Status   07/09/2021 107 94 - 109 mmol/L Final     Carbon Dioxide   Date Value Ref Range Status   07/09/2021 26 20 - 32 mmol/L Final     Anion Gap   Date Value Ref Range Status   07/09/2021 6 3 - 14 mmol/L Final     Glucose   Date Value Ref Range Status   07/09/2021 181 (H) 70 - 99 mg/dL Final     Urea Nitrogen   Date Value Ref Range Status   07/09/2021 13 7 - 30 mg/dL Final     Creatinine   Date Value Ref Range Status   07/09/2021 0.58 0.52 - 1.04 mg/dL Final     GFR Estimate   Date Value Ref Range Status   07/09/2021 >90 >60 mL/min/[1.73_m2] Final     Comment: "     Non  GFR Calc  Starting 12/18/2018, serum creatinine based estimated GFR (eGFR) will be   calculated using the Chronic Kidney Disease Epidemiology Collaboration   (CKD-EPI) equation.       Calcium   Date Value Ref Range Status   07/09/2021 8.6 8.5 - 10.1 mg/dL Final     Bilirubin Total   Date Value Ref Range Status   07/09/2021 0.5 0.2 - 1.3 mg/dL Final     Alkaline Phosphatase   Date Value Ref Range Status   07/09/2021 100 40 - 150 U/L Final     ALT   Date Value Ref Range Status   07/09/2021 24 0 - 50 U/L Final     AST   Date Value Ref Range Status   07/09/2021 25 0 - 45 U/L Final       No results found for: WBC  No results found for: RBC  No results found for: HGB  No results found for: HCT  No results found for: MCV  No results found for: MCH  No results found for: MCHC  No results found for: RDW  No results found for: PLT    INR   Date Value Ref Range Status   07/09/2021 1.26 (H) 0.86 - 1.14 Final        MELD-Na score: 9 at 7/9/2021 10:49 AM  MELD score: 9 at 7/9/2021 10:49 AM  Calculated from:  Serum Creatinine: 0.58 mg/dL (Rounded to 1 mg/dL) at 7/9/2021 10:49 AM  Serum Sodium: 139 mmol/L (Rounded to 137 mmol/L) at 7/9/2021 10:49 AM  Total Bilirubin: 0.5 mg/dL (Rounded to 1 mg/dL) at 7/9/2021 10:49 AM  INR(ratio): 1.26 at 7/9/2021 10:49 AM  Age: 66 years 5 months    Imaging:     RUQ US 6/15/2021    GALLBLADDER: The gallbladder is distended with evidence of wall thickening as well as ascites creating pericholecystic fluid. Redemonstrated is a nonshadowing 4 mm polyp without stones.    BILE DUCTS: No biliary dilatation. The common duct measures 4 mm.    LIVER: Coarsened echotexture, suggesting underlying cirrhosis. Nodular contour. No focal mass.    RIGHT KIDNEY: Normal size. Normal echogenicity with no hydronephrosis or mass.     LEFT KIDNEY: Normal size. Normal echogenicity with no hydronephrosis or mass.     SPLEEN: Splenomegaly measuring 21 cm.    PANCREAS: Poorly visualized.    AORTA:  "Normal in caliber.     IVC: Normal where visualized.    Moderate ascites.    IMPRESSION:  1.  Nodular cirrhotic liver with ascites.    2.  Splenomegaly may be related to portal hypertension.    3.  Wall thickening within the gallbladder may be related to cirrhosis, ascites, and incomplete distention. Sonographic García sign is negative. Gallbladder polyp unchanged.    Endoscopy:    EGD 5/12/2021  EV banding x 6  GVs no stigmata of bleeding    Independently reviewed labs and imaging.     Assessment/Plan: Ms. 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     She has a long history of DINH cirrhosis, recently decompensated for the first time with an apparent esophageal variceal bleed and new ascites. MELD is 9. Started on lasix but required a paracentesis recently.     Discussed the natural progression of DINH cirrhosis. Recommend continued medical management of her portal HTN complications (VB and and ascites). Her MELD is only 9, given this she would only be competitive for liver transplant using living donor liver transplant. Recommend continued medical management, do not feel the risks of transplant would outweigh the benefits at this time. Discussed we will continue to evaluate this, and if her MELD rises or her liver disease decompensated, can consider liver transplant at that time. Discussed she should stay up to date with cancer screening in case we are considering liver transplant, and also should work to lose weight given her BMI > 40.     - Continue lasix 20 mg daily - start spironolactone 25 mg daily given continued ascites on lasix. Check BMP Tuesday at [facility]  - Continue nadolol for secondary ppx of variceal bleeding - HR is close to goal. Order EGD for repeat banding. Ideally would go prior to her returning to [place]. Her episode of variceal bleeding May 2021 appear to have been an " esophageal variceal bleed, but she was also noted to have gastric varices that did not have stigmata  - Discussed that she may be a candidate for TIPS if having issues with ascites intolerant to diuretics or recurrent variceal bleeding  - HCC screening - RUQ US 6/2021 without masses, AFP normal today  - Continue to abstain from alcohol, recommend weight loss given her BMI > 40 would be an issue for her transplant candidacy  - She is due for colonoscopy - can do with EGD if schedule permits, but would prioritize doing her EGD prior to going back to [place].     RTC 3 months.    Linda Burton MD MS  Hepatology/Liver Transplant  [facility]    Approximately 40 minutes was spent for the visit with 60 minutes of non face-to-face time were spent in review of the patient's medical record on the day of the visit. This included review of previous: clinic visits, hospital records, lab results, imaging studies, and procedural documentation.  The findings from this review are summarized in the above note.        Linda Burton MD

## 2021-07-09 NOTE — LETTER
"    7/9/2021         RE: Chantelle Sutton  [address]  [address]  [place] [address]        Dear Colleague,    Thank you for referring your patient, Chantelle Sutton, to the [facility] [place]. Please see a copy of my visit note below.    [facility] New Patient Visit    Date of Visit: July 9, 2021    Reason for referral: Consult for decompensated DINH cirrhosis    Subjective: Ms. 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     She was first diagnosed with DINH based on elevated LFTs 4-5 years ago, was referred to GI (Dr. Lynn [facility]). Went on milk thistle and vitamin E. Reported some scarring on imaging, not sure if she was told she had cirrhosis, but underwent HCC screening every 6 months. Never had a liver bx.     She was in [place] May 2021, felt nauseated and developed hematemesis - underwent EGD that showed EVs that were banded x 6 and GVs that did not have stigmata of bleeding. She had a CT scan at that time - may have had ascites but not enough to tap. MELD apparently 15.     She returned back to [place].     Was started on lasix 20 mg daily a few weeks ago. She underwent her first paracentesis 7/7 - 2 L removed, she is not sure if there was more to remove. Not sent for diagnostic sampling.     Reports she is due for a colonoscopy, has a history of polyps. Last one was 5 years ago.     She is having knee issues, thinks she may need a knee replacement soon.     Weights 251 lbs, was 270 lbs earlier this year.     ROS: 14 point ROS negative except for positives noted in HPI.    PMHx:  Past Medical History:   Diagnosis Date     Diabetes (H)      Hepatitis    DM on insulin and metformin    PSHx:  C section   JACOB - tubes and ovaries removed, has her cervix left    FamHx:  Brother was a drink with cirrhosis, pancreatic cancer  No " family history of liver disease, liver cancer    SocHx:  Social History     Socioeconomic History     Marital status:      Spouse name: Not on file     Number of children: Not on file     Years of education: Not on file     Highest education level: Not on file   Occupational History     Not on file   Social Needs     Financial resource strain: Not on file     Food insecurity     Worry: Not on file     Inability: Not on file     Transportation needs     Medical: Not on file     Non-medical: Not on file   Tobacco Use     Smoking status: Never Smoker     Smokeless tobacco: Never Used   Substance and Sexual Activity     Alcohol use: Not Currently     Comment: Last drink was 30+ years ago     Drug use: Never     Sexual activity: Not on file   Lifestyle     Physical activity     Days per week: Not on file     Minutes per session: Not on file     Stress: Not on file   Relationships     Social connections     Talks on phone: Not on file     Gets together: Not on file     Attends Yarsani service: Not on file     Active member of club or organization: Not on file     Attends meetings of clubs or organizations: Not on file     Relationship status: Not on file     Intimate partner violence     Fear of current or ex partner: Not on file     Emotionally abused: Not on file     Physically abused: Not on file     Forced sexual activity: Not on file   Other Topics Concern     Parent/sibling w/ CABG, MI or angioplasty before 65F 55M? Not Asked   Social History Narrative     Not on file    lives with spouse.  Currently alternating between [place] and [place], plans to move her sister from [place] up here to [place]- date TBD when they buy a house;  Current residence in [place], lease expires 7/31, will be staying in [place] while sister recovers from illness with eventual plan to move back to [place] as soon as housing established in [place].    Rare alcohol use in the past    Medications:  Current Outpatient Medications   Medication     aspirin (ASA) 81 MG  "chewable tablet     biotin 2.5 MG CAPS     blood glucose (ONETOUCH VERIO IQ) test strip     Blood Glucose Monitoring Suppl (ACCU-CHEK GUIDE) w/Device KIT     furosemide (LASIX) 20 MG tablet     insulin pen needle (B-D U/F) 31G X 5 MM miscellaneous     metFORMIN (GLUCOPHAGE) 1000 MG tablet     nadolol (CORGARD) 20 MG tablet     pantoprazole (PROTONIX) 40 MG EC tablet     spironolactone (ALDACTONE) 25 MG tablet     ACCU-CHEK GUIDE test strip     B-D U/F insulin pen needle     Blood Glucose Monitoring Suppl (ACCU-CHEK GUIDE) w/Device KIT     cholecalciferol (VITAMIN D3) 1250 mcg (08128 units) capsule     LANTUS SOLOSTAR 100 UNIT/ML soln     No current facility-administered medications for this visit.    Lasix 20 mg daily - started a few weeks ago for LE edema and abdominal swelling  PPI for GERD  Blood sugars ok, A1c good    Allergies:  Allergies   Allergen Reactions     Doxycycline Diarrhea       Objective:  /74   Pulse 64   Temp 97.7  F (36.5  C) (Oral)   Ht 1.638 m (5' 4.5\")   Wt 114.1 kg (251 lb 9.6 oz)   SpO2 97%   BMI 42.52 kg/m    Constitutional: pleasant woman in NAD  Eyes: non icteric  Respiratory: Normal respiratory excursion   MSK: normal range of motion of visualized extremities  Abd: Obese, minimal ascites present  Skin: No jaundice  Psychiatric: normal mood and orientation    Labs:  Last Comprehensive Metabolic Panel:  Sodium   Date Value Ref Range Status   07/09/2021 139 133 - 144 mmol/L Final     Potassium   Date Value Ref Range Status   07/09/2021 3.7 3.4 - 5.3 mmol/L Final     Chloride   Date Value Ref Range Status   07/09/2021 107 94 - 109 mmol/L Final     Carbon Dioxide   Date Value Ref Range Status   07/09/2021 26 20 - 32 mmol/L Final     Anion Gap   Date Value Ref Range Status   07/09/2021 6 3 - 14 mmol/L Final     Glucose   Date Value Ref Range Status   07/09/2021 181 (H) 70 - 99 mg/dL Final     Urea Nitrogen   Date Value Ref Range Status   07/09/2021 13 7 - 30 mg/dL Final "     Creatinine   Date Value Ref Range Status   07/09/2021 0.58 0.52 - 1.04 mg/dL Final     GFR Estimate   Date Value Ref Range Status   07/09/2021 >90 >60 mL/min/[1.73_m2] Final     Comment:     Non  GFR Calc  Starting 12/18/2018, serum creatinine based estimated GFR (eGFR) will be   calculated using the Chronic Kidney Disease Epidemiology Collaboration   (CKD-EPI) equation.       Calcium   Date Value Ref Range Status   07/09/2021 8.6 8.5 - 10.1 mg/dL Final     Bilirubin Total   Date Value Ref Range Status   07/09/2021 0.5 0.2 - 1.3 mg/dL Final     Alkaline Phosphatase   Date Value Ref Range Status   07/09/2021 100 40 - 150 U/L Final     ALT   Date Value Ref Range Status   07/09/2021 24 0 - 50 U/L Final     AST   Date Value Ref Range Status   07/09/2021 25 0 - 45 U/L Final       No results found for: WBC  No results found for: RBC  No results found for: HGB  No results found for: HCT  No results found for: MCV  No results found for: MCH  No results found for: MCHC  No results found for: RDW  No results found for: PLT    INR   Date Value Ref Range Status   07/09/2021 1.26 (H) 0.86 - 1.14 Final        MELD-Na score: 9 at 7/9/2021 10:49 AM  MELD score: 9 at 7/9/2021 10:49 AM  Calculated from:  Serum Creatinine: 0.58 mg/dL (Rounded to 1 mg/dL) at 7/9/2021 10:49 AM  Serum Sodium: 139 mmol/L (Rounded to 137 mmol/L) at 7/9/2021 10:49 AM  Total Bilirubin: 0.5 mg/dL (Rounded to 1 mg/dL) at 7/9/2021 10:49 AM  INR(ratio): 1.26 at 7/9/2021 10:49 AM  Age: 66 years 5 months    Imaging:     RUQ US 6/15/2021    GALLBLADDER: The gallbladder is distended with evidence of wall thickening as well as ascites creating pericholecystic fluid. Redemonstrated is a nonshadowing 4 mm polyp without stones.    BILE DUCTS: No biliary dilatation. The common duct measures 4 mm.    LIVER: Coarsened echotexture, suggesting underlying cirrhosis. Nodular contour. No focal mass.    RIGHT KIDNEY: Normal size. Normal echogenicity with no  "hydronephrosis or mass.     LEFT KIDNEY: Normal size. Normal echogenicity with no hydronephrosis or mass.     SPLEEN: Splenomegaly measuring 21 cm.    PANCREAS: Poorly visualized.    AORTA: Normal in caliber.     IVC: Normal where visualized.    Moderate ascites.    IMPRESSION:  1.  Nodular cirrhotic liver with ascites.    2.  Splenomegaly may be related to portal hypertension.    3.  Wall thickening within the gallbladder may be related to cirrhosis, ascites, and incomplete distention. Sonographic García sign is negative. Gallbladder polyp unchanged.    Endoscopy:    EGD 5/12/2021  EV banding x 6  GVs no stigmata of bleeding    Independently reviewed labs and imaging.     Assessment/Plan: Ms. 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     She has a long history of DINH cirrhosis, recently decompensated for the first time with an apparent esophageal variceal bleed and new ascites. MELD is 9. Started on lasix but required a paracentesis recently.     Discussed the natural progression of DINH cirrhosis. Recommend continued medical management of her portal HTN complications (VB and and ascites). Her MELD is only 9, given this she would only be competitive for liver transplant using living donor liver transplant. Recommend continued medical management, do not feel the risks of transplant would outweigh the benefits at this time. Discussed we will continue to evaluate this, and if her MELD rises or her liver disease decompensated, can consider liver transplant at that time. Discussed she should stay up to date with cancer screening in case we are considering liver transplant, and also should work to lose weight given her BMI > 40.     - Continue lasix 20 mg daily - start spironolactone 25 mg daily given continued ascites on lasix. Check BMP Tuesday at [facility]  - Continue nadolol for secondary ppx " of variceal bleeding - HR is close to goal. Order EGD for repeat banding. Ideally would go prior to her returning to [place]. Her episode of variceal bleeding May 2021 appear to have been an esophageal variceal bleed, but she was also noted to have gastric varices that did not have stigmata  - Discussed that she may be a candidate for TIPS if having issues with ascites intolerant to diuretics or recurrent variceal bleeding  - HCC screening - RUQ US 6/2021 without masses, AFP normal today  - Continue to abstain from alcohol, recommend weight loss given her BMI > 40 would be an issue for her transplant candidacy  - She is due for colonoscopy - can do with EGD if schedule permits, but would prioritize doing her EGD prior to going back to [place].     RTC 3 months.    Linda Burton MD MS  Hepatology/Liver Transplant  [facility]    Approximately 40 minutes was spent for the visit with 60 minutes of non face-to-face time were spent in review of the patient's medical record on the day of the visit. This included review of previous: clinic visits, hospital records, lab results, imaging studies, and procedural documentation.  The findings from this review are summarized in the above note.      Again, thank you for allowing me to participate in the care of your patient.        Sincerely,        Linda Burton MD

## 2021-07-09 NOTE — NURSING NOTE
"Chief Complaint   Patient presents with     Consult     consult cirrhosis of liver     /74   Pulse 64   Temp 97.7  F (36.5  C) (Oral)   Ht 1.638 m (5' 4.5\")   Wt 114.1 kg (251 lb 9.6 oz)   SpO2 97%   BMI 42.52 kg/m       Elsie Perrin MA  "

## 2021-07-12 DIAGNOSIS — Z11.59 ENCOUNTER FOR SCREENING FOR OTHER VIRAL DISEASES: ICD-10-CM

## 2021-07-12 LAB
GAMMA INTERFERON BACKGROUND BLD IA-ACNC: 0.03 IU/ML
M TB IFN-G CD4+ BCKGRND COR BLD-ACNC: 2.59 IU/ML
M TB TUBERC IFN-G BLD QL: NEGATIVE
MITOGEN IGNF BCKGRD COR BLD-ACNC: 0 IU/ML
MITOGEN IGNF BCKGRD COR BLD-ACNC: 0 IU/ML

## 2021-07-14 ENCOUNTER — TELEPHONE (OUTPATIENT)
Dept: GASTROENTEROLOGY | Facility: CLINIC | Age: 66
End: 2021-07-14

## 2021-07-14 DIAGNOSIS — R18.8 CIRRHOSIS OF LIVER WITH ASCITES, UNSPECIFIED HEPATIC CIRRHOSIS TYPE (H): Primary | ICD-10-CM

## 2021-07-14 DIAGNOSIS — K74.60 CIRRHOSIS OF LIVER WITH ASCITES, UNSPECIFIED HEPATIC CIRRHOSIS TYPE (H): Primary | ICD-10-CM

## 2021-07-14 NOTE — TELEPHONE ENCOUNTER
----- Message from Dayanna Chance LPN sent at 7/12/2021  8:19 AM CDT -----    ----- Message -----  From: Linda Burton MD  Sent: 7/9/2021   9:54 PM CDT  To: [facility]    Hey -    Can we send orders for this patient to get a BMP at [facility] next week, and also request her GI records from Dr. Choco Lynn in [facility]    Thanks!    Linda

## 2021-07-14 NOTE — TELEPHONE ENCOUNTER
BMP order faxed to [facility] due next week, pt updated via [employer], message was read.    Faxed request  to Dr. Choco Lynn in [place], [place] for GI records on pt.    Dayanna BATES LPN  Hepatology Clinic

## 2021-07-15 LAB — PETH BLD-MCNC: NEGATIVE NG/ML

## 2021-07-18 ENCOUNTER — DOCUMENTATION ONLY (OUTPATIENT)
Dept: TRANSPLANT | Facility: CLINIC | Age: 66
End: 2021-07-18

## 2021-07-19 ENCOUNTER — TELEPHONE (OUTPATIENT)
Dept: GASTROENTEROLOGY | Facility: CLINIC | Age: 66
End: 2021-07-19

## 2021-07-19 DIAGNOSIS — Z12.11 SPECIAL SCREENING FOR MALIGNANT NEOPLASMS, COLON: Primary | ICD-10-CM

## 2021-07-19 RX ORDER — BISACODYL 5 MG
TABLET, DELAYED RELEASE (ENTERIC COATED) ORAL
Qty: 2 TABLET | Refills: 0 | Status: SHIPPED | OUTPATIENT
Start: 2021-07-19 | End: 2021-10-28

## 2021-07-19 NOTE — TELEPHONE ENCOUNTER
Pre assessment questions completed for upcoming colonoscopy/EGD procedure scheduled on 7/29/21    COVID test scheduled 7/27/21    Extended prep script sent to [id number] - [place], [place] - [address] [place] [address] (BMI 42).  Prep instructions sent via [employer].    Reviewed Extended prep instructions with patient.     Procedural arrival time and facility location reviewed.    Designated  policy reviewed. Patient states she will be taking a taxi home but her  will be accompanying her.     Patient verbalized understanding and had no questions or concerns at this time.    Upon review or is for deep/MAC sedation. Patient is currently scheduled under conscious. Will send to endoscopy scheduling to facilitate sedation change.     Lashanda Nix RN

## 2021-07-27 ENCOUNTER — LAB (OUTPATIENT)
Dept: LAB | Facility: CLINIC | Age: 66
End: 2021-07-27
Attending: STUDENT IN AN ORGANIZED HEALTH CARE EDUCATION/TRAINING PROGRAM
Payer: COMMERCIAL

## 2021-07-27 DIAGNOSIS — Z11.59 ENCOUNTER FOR SCREENING FOR OTHER VIRAL DISEASES: ICD-10-CM

## 2021-07-27 DIAGNOSIS — R18.8 CIRRHOSIS OF LIVER WITH ASCITES, UNSPECIFIED HEPATIC CIRRHOSIS TYPE (H): ICD-10-CM

## 2021-07-27 DIAGNOSIS — K74.60 CIRRHOSIS OF LIVER WITH ASCITES, UNSPECIFIED HEPATIC CIRRHOSIS TYPE (H): ICD-10-CM

## 2021-07-27 LAB — SARS-COV-2 RNA RESP QL NAA+PROBE: NEGATIVE

## 2021-07-27 PROCEDURE — U0003 INFECTIOUS AGENT DETECTION BY NUCLEIC ACID (DNA OR RNA); SEVERE ACUTE RESPIRATORY SYNDROME CORONAVIRUS 2 (SARS-COV-2) (CORONAVIRUS DISEASE [COVID-19]), AMPLIFIED PROBE TECHNIQUE, MAKING USE OF HIGH THROUGHPUT TECHNOLOGIES AS DESCRIBED BY CMS-2020-01-R: HCPCS | Mod: 90 | Performed by: PATHOLOGY

## 2021-07-27 PROCEDURE — U0005 INFEC AGEN DETEC AMPLI PROBE: HCPCS | Mod: 90 | Performed by: PATHOLOGY

## 2021-07-29 ENCOUNTER — HOSPITAL ENCOUNTER (OUTPATIENT)
Facility: AMBULATORY SURGERY CENTER | Age: 66
End: 2021-07-29
Attending: STUDENT IN AN ORGANIZED HEALTH CARE EDUCATION/TRAINING PROGRAM
Payer: COMMERCIAL

## 2021-07-29 ENCOUNTER — ANESTHESIA EVENT (OUTPATIENT)
Dept: SURGERY | Facility: AMBULATORY SURGERY CENTER | Age: 66
End: 2021-07-29
Payer: COMMERCIAL

## 2021-07-29 ENCOUNTER — ANESTHESIA (OUTPATIENT)
Dept: SURGERY | Facility: AMBULATORY SURGERY CENTER | Age: 66
End: 2021-07-29
Payer: COMMERCIAL

## 2021-07-29 VITALS
SYSTOLIC BLOOD PRESSURE: 127 MMHG | OXYGEN SATURATION: 96 % | RESPIRATION RATE: 16 BRPM | WEIGHT: 236 LBS | BODY MASS INDEX: 39.32 KG/M2 | TEMPERATURE: 97.5 F | HEART RATE: 57 BPM | HEIGHT: 65 IN | DIASTOLIC BLOOD PRESSURE: 66 MMHG

## 2021-07-29 VITALS — HEART RATE: 61 BPM

## 2021-07-29 DIAGNOSIS — I85.11 ESOPHAGEAL VARICES WITH BLEEDING IN DISEASES CLASSIFIED ELSEWHERE (H): Primary | ICD-10-CM

## 2021-07-29 LAB
COLONOSCOPY: NORMAL
GLUCOSE BLDC GLUCOMTR-MCNC: 101 MG/DL (ref 70–99)
UPPER GI ENDOSCOPY: NORMAL

## 2021-07-29 PROCEDURE — 43244 EGD VARICES LIGATION: CPT

## 2021-07-29 PROCEDURE — 45380 COLONOSCOPY AND BIOPSY: CPT | Mod: PT

## 2021-07-29 PROCEDURE — 88305 TISSUE EXAM BY PATHOLOGIST: CPT | Performed by: PATHOLOGY

## 2021-07-29 RX ORDER — LIDOCAINE 40 MG/G
CREAM TOPICAL
Status: DISCONTINUED | OUTPATIENT
Start: 2021-07-29 | End: 2021-07-29 | Stop reason: HOSPADM

## 2021-07-29 RX ORDER — NALOXONE HYDROCHLORIDE 0.4 MG/ML
0.4 INJECTION, SOLUTION INTRAMUSCULAR; INTRAVENOUS; SUBCUTANEOUS
Status: DISCONTINUED | OUTPATIENT
Start: 2021-07-29 | End: 2021-07-30 | Stop reason: HOSPADM

## 2021-07-29 RX ORDER — SODIUM CHLORIDE, SODIUM LACTATE, POTASSIUM CHLORIDE, CALCIUM CHLORIDE 600; 310; 30; 20 MG/100ML; MG/100ML; MG/100ML; MG/100ML
INJECTION, SOLUTION INTRAVENOUS CONTINUOUS
Status: DISCONTINUED | OUTPATIENT
Start: 2021-07-29 | End: 2021-07-29 | Stop reason: HOSPADM

## 2021-07-29 RX ORDER — PROPOFOL 10 MG/ML
INJECTION, EMULSION INTRAVENOUS CONTINUOUS PRN
Status: DISCONTINUED | OUTPATIENT
Start: 2021-07-29 | End: 2021-07-29

## 2021-07-29 RX ORDER — ONDANSETRON 2 MG/ML
4 INJECTION INTRAMUSCULAR; INTRAVENOUS EVERY 6 HOURS PRN
Status: DISCONTINUED | OUTPATIENT
Start: 2021-07-29 | End: 2021-07-30 | Stop reason: HOSPADM

## 2021-07-29 RX ORDER — ONDANSETRON 4 MG/1
4 TABLET, ORALLY DISINTEGRATING ORAL EVERY 6 HOURS PRN
Status: DISCONTINUED | OUTPATIENT
Start: 2021-07-29 | End: 2021-07-30 | Stop reason: HOSPADM

## 2021-07-29 RX ORDER — SIMETHICONE
LIQUID (ML) MISCELLANEOUS PRN
Status: DISCONTINUED | OUTPATIENT
Start: 2021-07-29 | End: 2021-07-29 | Stop reason: HOSPADM

## 2021-07-29 RX ORDER — NALOXONE HYDROCHLORIDE 0.4 MG/ML
0.2 INJECTION, SOLUTION INTRAMUSCULAR; INTRAVENOUS; SUBCUTANEOUS
Status: DISCONTINUED | OUTPATIENT
Start: 2021-07-29 | End: 2021-07-30 | Stop reason: HOSPADM

## 2021-07-29 RX ORDER — PROPOFOL 10 MG/ML
INJECTION, EMULSION INTRAVENOUS PRN
Status: DISCONTINUED | OUTPATIENT
Start: 2021-07-29 | End: 2021-07-29

## 2021-07-29 RX ORDER — FLUMAZENIL 0.1 MG/ML
0.2 INJECTION, SOLUTION INTRAVENOUS
Status: DISCONTINUED | OUTPATIENT
Start: 2021-07-29 | End: 2021-07-30 | Stop reason: HOSPADM

## 2021-07-29 RX ORDER — GLYCOPYRROLATE 0.2 MG/ML
INJECTION, SOLUTION INTRAMUSCULAR; INTRAVENOUS PRN
Status: DISCONTINUED | OUTPATIENT
Start: 2021-07-29 | End: 2021-07-29

## 2021-07-29 RX ORDER — LIDOCAINE HYDROCHLORIDE 20 MG/ML
INJECTION, SOLUTION INFILTRATION; PERINEURAL PRN
Status: DISCONTINUED | OUTPATIENT
Start: 2021-07-29 | End: 2021-07-29

## 2021-07-29 RX ORDER — PROCHLORPERAZINE MALEATE 5 MG
5 TABLET ORAL EVERY 6 HOURS PRN
Status: DISCONTINUED | OUTPATIENT
Start: 2021-07-29 | End: 2021-07-30 | Stop reason: HOSPADM

## 2021-07-29 RX ORDER — ONDANSETRON 2 MG/ML
4 INJECTION INTRAMUSCULAR; INTRAVENOUS
Status: DISCONTINUED | OUTPATIENT
Start: 2021-07-29 | End: 2021-07-29 | Stop reason: HOSPADM

## 2021-07-29 RX ADMIN — GLYCOPYRROLATE 0.2 MG: 0.2 INJECTION, SOLUTION INTRAMUSCULAR; INTRAVENOUS at 13:45

## 2021-07-29 RX ADMIN — PROPOFOL 175 MCG/KG/MIN: 10 INJECTION, EMULSION INTRAVENOUS at 14:08

## 2021-07-29 RX ADMIN — LIDOCAINE HYDROCHLORIDE 50 MG: 20 INJECTION, SOLUTION INFILTRATION; PERINEURAL at 13:45

## 2021-07-29 RX ADMIN — SODIUM CHLORIDE, SODIUM LACTATE, POTASSIUM CHLORIDE, CALCIUM CHLORIDE: 600; 310; 30; 20 INJECTION, SOLUTION INTRAVENOUS at 12:43

## 2021-07-29 RX ADMIN — PROPOFOL 250 MCG/KG/MIN: 10 INJECTION, EMULSION INTRAVENOUS at 13:49

## 2021-07-29 RX ADMIN — PROPOFOL 60 MG: 10 INJECTION, EMULSION INTRAVENOUS at 13:49

## 2021-07-29 ASSESSMENT — MIFFLIN-ST. JEOR: SCORE: 1608.43

## 2021-07-29 NOTE — ANESTHESIA CARE TRANSFER NOTE
Patient: Chantelle Sutton    Procedure(s):  ESOPHAGOGASTRODUODENOSCOPY, WITH BANDING OF VARICES  COLONOSCOPY, WITH POLYPECTOMY AND BIOPSY    Diagnosis: Cirrhosis of liver with ascites, unspecified hepatic cirrhosis type (H) [K74.60, R18.8]  Diagnosis Additional Information: No value filed.    Anesthesia Type:   MAC     Note:    Oropharynx: oropharynx clear of all foreign objects  Level of Consciousness: awake  Oxygen Supplementation: room air    Independent Airway: airway patency satisfactory and stable  Dentition: dentition unchanged  Vital Signs Stable: post-procedure vital signs reviewed and stable  Report to RN Given: handoff report given  Patient transferred to: Phase II    Handoff Report: Identifed the Patient, Identified the Reponsible Provider, Reviewed the pertinent medical history, Discussed the surgical course, Reviewed Intra-OP anesthesia mangement and issues during anesthesia, Set expectations for post-procedure period and Allowed opportunity for questions and acknowledgement of understanding      Vitals:  Vitals Value Taken Time   /65 07/29/21 1500   Temp 36.3  C (97.4  F) 07/29/21 1500   Pulse     Resp 16 07/29/21 1500   SpO2 98 % 07/29/21 1500       Electronically Signed By: NANETTE Cummings CRNA  July 29, 2021  3:03 PM

## 2021-07-29 NOTE — ANESTHESIA POSTPROCEDURE EVALUATION
Patient: Chantelle Sutton    Procedure(s):  ESOPHAGOGASTRODUODENOSCOPY, WITH BANDING OF VARICES  COLONOSCOPY, WITH POLYPECTOMY AND BIOPSY    Diagnosis:Cirrhosis of liver with ascites, unspecified hepatic cirrhosis type (H) [K74.60, R18.8]  Diagnosis Additional Information: No value filed.    Anesthesia Type:  MAC    Note:  Disposition: Outpatient   Postop Pain Control: Uneventful            Sign Out: Well controlled pain   PONV:    Neuro/Psych: Uneventful            Sign Out: Acceptable/Baseline neuro status   Airway/Respiratory: Uneventful            Sign Out: Acceptable/Baseline resp. status   CV/Hemodynamics: Uneventful            Sign Out: Acceptable CV status; No obvious hypovolemia; No obvious fluid overload   Other NRE:    DID A NON-ROUTINE EVENT OCCUR?            Last vitals:  Vitals Value Taken Time   /66 07/29/21 1515   Temp 36.4  C (97.5  F) 07/29/21 1515   Pulse     Resp 16 07/29/21 1500   SpO2 96 % 07/29/21 1515       Electronically Signed By: Juvenal Ferraro MD  July 29, 2021  4:04 PM

## 2021-07-29 NOTE — H&P
Chantelle Sutton  [id number]  female  66 year old      Reason for procedure/surgery: EGD to follow up variceal bleeding, colonoscopy for history of polyps    Patient Active Problem List   Diagnosis     Controlled type 2 diabetes mellitus without complication, with long-term current use of insulin (H)     H/O total hysterectomy     DINH (nonalcoholic steatohepatitis)     Thrombocytopenia (H)     Morbid obesity (H)       Past Surgical History:  History reviewed. No pertinent surgical history.    Past Medical History:   Past Medical History:   Diagnosis Date     Diabetes (H)      Hepatitis        Social History:   Social History     Tobacco Use     Smoking status: Never Smoker     Smokeless tobacco: Never Used   Substance Use Topics     Alcohol use: Not Currently     Comment: Last drink was 30+ years ago       Family History: History reviewed. No pertinent family history.    Allergies:   Allergies   Allergen Reactions     Doxycycline Diarrhea       Active Medications:   Current Outpatient Medications   Medication Sig Dispense Refill     bisacodyl (DULCOLAX) 5 MG EC tablet Take as directed. One day prior to exam at 10:00am take 2 tablets 2 tablet 0     furosemide (LASIX) 20 MG tablet Take 20 mg by mouth       LANTUS SOLOSTAR 100 UNIT/ML soln INJECT 35 UNITS UNDER THE SKIN EVERY MORNING, AND 30 UNITS AT BEDTIME       magnesium citrate solution Take as directed. Two days prior to exam drink 10oz bottle of magnesium citrate at 4:00pm 296 mL 0     metFORMIN (GLUCOPHAGE) 1000 MG tablet Take 1,000 mg by mouth       nadolol (CORGARD) 20 MG tablet Take 20 mg by mouth       pantoprazole (PROTONIX) 40 MG EC tablet Take 40 mg by mouth       polyethylene glycol (GOLYTELY) 236 g suspension Take as directed. One day before your exam fill the first container with water. Cover and shake until mixed well. At 3:00pm drink one 8oz glass every 10-15 minutes until half of the first container is empty. Store the remainder in the  "refrigerator. At 8:00pm drink the second half of the first container until it is gone. Before you go to bed mix the second container with water and put in refrigerator. Six hours before your check in time drink one 8oz glass every 10-15 minutes until half of container is empty. Discard the remainder of solution. 8000 mL 0     spironolactone (ALDACTONE) 25 MG tablet Take 1 tablet (25 mg) by mouth daily 30 tablet 3     ACCU-CHEK GUIDE test strip AS DIRECTED. TEST 3 TIMES PER DAY.       B-D U/F insulin pen needle ONCE A DAY WITH LANTUS INJECTION       biotin 2.5 MG CAPS Take 2,500 mcg by mouth       blood glucose (ONETOUCH VERIO IQ) test strip Dispense item covered by pt ins. E11.65 NIDDM type II, uncontrolled - Test 3 times/day, Reason: Unstable diabetes       Blood Glucose Monitoring Suppl (ACCU-CHEK GUIDE) w/Device KIT DISPENSE METER, TEST STRIPS, LANCETS COVERED BY PT INS. E11.9 IDDM TYPE II   TEST 3 TIMES/DAY.       Blood Glucose Monitoring Suppl (ACCU-CHEK GUIDE) w/Device KIT DISPENSE METER, TEST STRIPS, LANCETS COVERED BY PT INS. E11.9 IDDM TYPE II   TEST 3 TIMES/DAY.       cholecalciferol (VITAMIN D3) 1250 mcg (32684 units) capsule TAKE 1 CAPSULE BY MOUTH ONCE WEEKLY FOR 8 DOSES.       insulin pen needle (B-D U/F) 31G X 5 MM miscellaneous ONCE A DAY WITH LANTUS INJECTION         Systemic Review:   CONSTITUTIONAL: NEGATIVE for fever, chills, change in weight  ENT/MOUTH: NEGATIVE for ear, mouth and throat problems  RESP: NEGATIVE for significant cough or SOB  CV: NEGATIVE for chest pain, palpitations or peripheral edema    Physical Examination:   Vital Signs: /66   Pulse 57   Temp 97.3  F (36.3  C) (Temporal)   Resp 18   Ht 1.638 m (5' 4.5\")   Wt 107 kg (236 lb)   SpO2 98%   BMI 39.88 kg/m    GENERAL: healthy, alert and no distress  NECK: no adenopathy, no asymmetry, masses, or scars  RESP: lungs clear to auscultation - no rales, rhonchi or wheezes  CV: regular rate and rhythm, normal S1 S2, no S3 or " S4, no murmur, click or rub, no peripheral edema and peripheral pulses strong  ABDOMEN: soft, nontender, no hepatosplenomegaly, no masses and bowel sounds normal  MS: no gross musculoskeletal defects noted, no edema    Plan: Appropriate to proceed as scheduled.      Linda Burton MD  7/29/2021    PCP:  Kellie Breen

## 2021-07-29 NOTE — ANESTHESIA PREPROCEDURE EVALUATION
Anesthesia Pre-Procedure Evaluation    Patient: Chantelle Sutton   MRN: [id number] : 1955        Preoperative Diagnosis: Cirrhosis of liver with ascites, unspecified hepatic cirrhosis type (H) [K74.60, R18.8]   Procedure : Procedure(s):  ESOPHAGOGASTRODUODENOSCOPY (EGD)  COLONOSCOPY     Past Medical History:   Diagnosis Date     Diabetes (H)      Hepatitis       History reviewed. No pertinent surgical history.   Allergies   Allergen Reactions     Doxycycline Diarrhea      Social History     Tobacco Use     Smoking status: Never Smoker     Smokeless tobacco: Never Used   Substance Use Topics     Alcohol use: Not Currently     Comment: Last drink was 30+ years ago      Wt Readings from Last 1 Encounters:   21 107 kg (236 lb)           Physical Exam    Airway        Mallampati: III   TM distance: > 3 FB   Neck ROM: full   Mouth opening: > 3 cm    Respiratory Devices and Support         Dental           Cardiovascular             Pulmonary                   OUTSIDE LABS:  CBC:   Lab Results   Component Value Date    WBC 3.9 (L) 2021    HGB 10.0 (L) 2021    HCT 31.4 (L) 2021    PLT 77 (L) 2021     BMP:   Lab Results   Component Value Date     2021    POTASSIUM 3.7 2021    POTASSIUM 3.9 2021    CHLORIDE 107 2021    CO2 26 2021    BUN 13 2021    CR 0.58 2021    CR 0.64 2021     (H) 2021    GLC 76 2021     COAGS:   Lab Results   Component Value Date    INR 1.26 (H) 2021     POC: No results found for: BGM, HCG, HCGS  HEPATIC:   Lab Results   Component Value Date    ALBUMIN 3.0 (L) 2021    PROTTOTAL 8.0 2021    ALT 24 2021    AST 25 2021    ALKPHOS 100 2021    BILITOTAL 0.5 2021     OTHER:   Lab Results   Component Value Date    A1C 6.3 (A) 2021    SHAD 8.6 2021    PHOS 3.2 2021    TSH 3.39 2021       Anesthesia Plan    ASA Status:  3   NPO Status:  NPO  Appropriate    Anesthesia Type: MAC.     - Reason for MAC: straight local not clinically adequate   Induction: Intravenous, Propofol.   Maintenance: TIVA.        Consents    Anesthesia Plan(s) and associated risks, benefits, and realistic alternatives discussed. Questions answered and patient/representative(s) expressed understanding.     - Discussed with:  Patient      - Extended Intubation/Ventilatory Support Discussed: No.      - Patient is DNR/DNI Status: No    Use of blood products discussed: No .     Postoperative Care    Pain management: Multi-modal analgesia.   PONV prophylaxis: Ondansetron (or other 5HT-3), Background Propofol Infusion     Comments:                Juvenal Ferraro MD

## 2021-07-29 NOTE — LETTER
"August 6, 2021      Chantelle Sutton  [address] [address]        Dear ,    We are writing to inform you of your test results.    {results letter list:[id number]}    Resulted Orders   Surgical Pathology Exam   Result Value Ref Range    Case Report       Surgical Pathology Report                         Case: [id number]                                  Authorizing Provider:  Linda Burton MD          Collected:           07/29/2021 02:15 PM          Ordering Location:     [facility] OR  Received:            07/29/2021 03:08 PM                                 [place]                                                                  Pathologist:           Shanti Sood MD                                                             Specimens:   A) - Other, Cecal Polyp                                                                             B) - Other, Colon Erythema Biopsies                                                                 C) - Other, Transverse Colon Polyps X 2                                                             D) - Other, Rectal Polyps X 2                                                              Final Diagnosis       A. CECAL POLYP:  -Colonic mucosa with lymphoid aggregate  -No evidence of neoplastic polyp     B. COLON ERYTHEMA BIOPSIES:  -Colonic mucosa with no significant histopathologic abnormality  -No evidence of chronic active or microscopic colitis     C. TRANSVERSE COLON POLYPS X2:  -Colonic mucosa with no significant histopathologic abnormality  -No evidence of neoplastic polyp     D. RECTAL POLYPS X2:  -Hyperplastic polyp(s)        Clinical Information       Cirrhosis of liver with ascites      Case Images      Gross Description       A. Other, Cecal Polyp:  The specimen is received in formalin with proper patient identification, labeled \"cecal polyp\".  The specimen consists of a 0.5 x 0.2 x 0.1 cm tan-pink piece of tissue, " "submitted in toto in cassette A1.       B. Other, Colon Erythema Biopsies:  The specimen is received in formalin with proper patient identification, labeled \"colon erythema biopsies\".  The specimen consists of a 0.2 x 0.1 x 0.1 cm tan-pink piece of tissue, submitted in toto in cassette B1.     C. Other, Transverse Colon Polyps X 2:  The specimen is received in formalin with proper patient identification, labeled \"transverse colon polyps x2\".  The specimen consists of 2 pieces of tan-pink tissue ranging from 0.4 to 0.5 cm in greatest dimension, submitted in toto in cassette C1.       D. Other, Rectal Polyps X 2:    The specimen is received in formalin with proper patient identification, labeled \"rectal polyps x2\".  The specimen consists of 3 pieces of tan-pink tissue ranging from 0.1 to 0.3 cm in great dimension, submitted in toto in cassette D1.       Microscopic Description       Microscopic examination has been performed.       Performing Labs       The technical component of this testing was completed at [facility]         If you have any questions or concerns, please call the clinic at the number listed above.       Sincerely,      Linda Burton MD          "

## 2021-07-29 NOTE — LETTER
August 6, 2021      Chantelle Sutton  [address] [address]        Dear ,    We are writing to inform you of your test results.    All of polyps removed and tissue biopsied during your colonoscopy returned as benign colon or benign polyps Please note there was NO cancer in the biopsies.    Given the finding of this type of polyp I recommend that you undergo a repeat colonoscopy in 5-10 years. This will be based on the results of your previous colonoscopies, which I will review as a part of your follow up in [facility].  However, a sooner examination might be necessary if you start developing any symptoms such as rectal bleeding, change in bowel habits, anemia, etc.  Please discuss this with your primary physician at the time of your next office appointment.  Your primary physician will schedule this repeat colonoscopy at the appropriate time.    It has been a pleasure to participate in your care.  Please call our clinic if you have any questions or concerns.        Resulted Orders   Surgical Pathology Exam   Result Value Ref Range    Case Report       Surgical Pathology Report                         Case: [id number]                                  Authorizing Provider:  Linda Burton MD          Collected:           07/29/2021 02:15 PM          Ordering Location:     [facility] OR  Received:            07/29/2021 03:08 PM                                 [place]                                                                  Pathologist:           Shanti Sood MD                                                             Specimens:   A) - Other, Cecal Polyp                                                                             B) - Other, Colon Erythema Biopsies                                                                 C) - Other, Transverse Colon Polyps X 2                                                             D) - Other, Rectal  "Polyps X 2                                                              Final Diagnosis       A. CECAL POLYP:  -Colonic mucosa with lymphoid aggregate  -No evidence of neoplastic polyp     B. COLON ERYTHEMA BIOPSIES:  -Colonic mucosa with no significant histopathologic abnormality  -No evidence of chronic active or microscopic colitis     C. TRANSVERSE COLON POLYPS X2:  -Colonic mucosa with no significant histopathologic abnormality  -No evidence of neoplastic polyp     D. RECTAL POLYPS X2:  -Hyperplastic polyp(s)        Clinical Information       Cirrhosis of liver with ascites      Case Images      Gross Description       A. Other, Cecal Polyp:  The specimen is received in formalin with proper patient identification, labeled \"cecal polyp\".  The specimen consists of a 0.5 x 0.2 x 0.1 cm tan-pink piece of tissue, submitted in toto in cassette A1.       B. Other, Colon Erythema Biopsies:  The specimen is received in formalin with proper patient identification, labeled \"colon erythema biopsies\".  The specimen consists of a 0.2 x 0.1 x 0.1 cm tan-pink piece of tissue, submitted in toto in cassette B1.     C. Other, Transverse Colon Polyps X 2:  The specimen is received in formalin with proper patient identification, labeled \"transverse colon polyps x2\".  The specimen consists of 2 pieces of tan-pink tissue ranging from 0.4 to 0.5 cm in greatest dimension, submitted in toto in cassette C1.       D. Other, Rectal Polyps X 2:    The specimen is received in formalin with proper patient identification, labeled \"rectal polyps x2\".  The specimen consists of 3 pieces of tan-pink tissue ranging from 0.1 to 0.3 cm in great dimension, submitted in toto in cassette D1.       Microscopic Description       Microscopic examination has been performed.       Performing Labs       The technical component of this testing was completed at [facility]         If you have any " questions or concerns, please call the clinic at the number listed above.       Sincerely,      Linda Burton MD

## 2021-07-30 ENCOUNTER — TELEPHONE (OUTPATIENT)
Dept: GASTROENTEROLOGY | Facility: CLINIC | Age: 66
End: 2021-07-30

## 2021-07-30 LAB
PATH REPORT.COMMENTS IMP SPEC: NORMAL
PATH REPORT.COMMENTS IMP SPEC: NORMAL
PATH REPORT.FINAL DX SPEC: NORMAL
PATH REPORT.GROSS SPEC: NORMAL
PATH REPORT.MICROSCOPIC SPEC OTHER STN: NORMAL
PATH REPORT.RELEVANT HX SPEC: NORMAL
PHOTO IMAGE: NORMAL

## 2021-07-30 NOTE — TELEPHONE ENCOUNTER
Screening Questions  1. Are you active on mychart? Yes     2. What insurance is in the chart? Hp    2.  Ordering/Referring Provider: Linda Burton MD    3. BMI 40.5    4. Are you on daily home oxygen? N    5. Do you have a history of difficult airway? N    6. Have you had a heart, lung, or liver transplant? N - UNDER MONITOR FOR LIVER TRANSPLANT    7. Are you currently on dialysis? N    8. Have you had a stroke or Transient ischemic atttack (TIA) within 6 months? N    9. In the past 6 months, have you had any heart related issues including cardiomyopathy or heart attack?         If yes, did it require cardiac stenting or other implantable device?N    10. Do you have any implantable devices in your body (pacemaker, defib, LVAD)? N    11. Do you take nitroglycerin? If yes, how often? N    12. Are you currently taking any blood thinners?N    13. Are you a diabetic? Y - INSULIN DEPENDANT     14. (Females) Are you currently pregnant?   If yes, how many weeks?    15. Have you had a procedure in the past that was difficult to tolerate with conscious sedation? Any allergies to Fentanyl or Versed N    16. Are you taking any scheduled prescription narcotics more than once daily? N    17. Do you have any chemical dependencies such as alcohol, street drugs, or methadone? N    18. Do you have any history of post-traumatic stress syndrome or mental health issues? N    19. Do you transfer independently? Y    20.  Do you have any issues with constipation? N    21. Preferred Pharmacy for Pre Prescription On Chart/[facility]    Scheduling Details    Colonoscopy Prep Sent?: N/A   Procedure Scheduled: EGD  Provider/Surgeon: JASON Burton  Date of Procedure: 09/16/2021  [place] [facility] [place]   Caller (Please ask for phone number if not scheduled by patient): Chantelle Sutton/Self      Sedation Type: CS  Conscious Sedation- Needs  for 6 hours after the procedure  MAC/General-Needs  for 24 hours after procedure    Pre-op Required at [facility]  [place], [place] and OR for MAC sedation:   (if yes advise patient they will need a pre-op prior to procedure)      Is patient on blood thinners? -N (If yes- inform patient to follow up with PCP or provider for follow up instructions)     Informed patient they will need an adult  Y  Cannot take any type of public or medical transportation alone    Informed Patient of COVID Test Requirement Y    Confirmed Nurse will call to complete assessment Y    Additional comments: N

## 2021-07-31 DIAGNOSIS — Z11.59 ENCOUNTER FOR SCREENING FOR OTHER VIRAL DISEASES: ICD-10-CM

## 2021-09-08 ENCOUNTER — TELEPHONE (OUTPATIENT)
Dept: GASTROENTEROLOGY | Facility: CLINIC | Age: 66
End: 2021-09-08

## 2021-09-08 NOTE — TELEPHONE ENCOUNTER
Caller: Chantelle Sutton    Procedure: EGD    Date of Procedure Cancelled: 9/16/2021    Ordering Provider:Josephine    Reason for cancel: pt could not leave [place] for this procedure due to family issues.    Any Staff messages sent regarding case?:                   Recipient and Sender:  & *                  Message Details:       Rescheduled: yes     If rescheduled:    Date: 10/28/2021   [place] [facility] [place]   Note any change or update to original order/sedation: no

## 2021-09-08 NOTE — TELEPHONE ENCOUNTER
Attempted to contact patient regarding upcoming upper endoscopy procedure on 9-16 for pre assessment questions. No answer.     Left message to return call to [phone] #2    Covid test scheduled: 9-13 [facility]    Arrival time: 6 AM    Facility location: [facility]    Sedation type: conscious    Bowel prep recommendation: N/a    Brie Caceres RN

## 2021-10-15 ENCOUNTER — TELEPHONE (OUTPATIENT)
Dept: GASTROENTEROLOGY | Facility: CLINIC | Age: 66
End: 2021-10-15

## 2021-10-15 NOTE — TELEPHONE ENCOUNTER
Pre assessment questions completed for upcoming EGD procedure scheduled on 10/28/21    COVID test scheduled 10/25/21    Procedural arrival time and facility location reviewed.    Designated  policy reviewed. Instructed to have someone stay 24 hours post procedure.     Reviewed EGD prep instructions with patient. No fiber/iron supplements or foods that contain nuts/seeds 7 days prior to procedure.     Anticoagulation/blood thinners? no    Electronic implanted devices? no    Patient verbalized understanding and had no questions or concerns at this time.    Lashanda Nix RN

## 2021-10-17 ENCOUNTER — HEALTH MAINTENANCE LETTER (OUTPATIENT)
Age: 66
End: 2021-10-17

## 2021-10-18 DIAGNOSIS — K74.60 CIRRHOSIS OF LIVER WITH ASCITES, UNSPECIFIED HEPATIC CIRRHOSIS TYPE (H): Primary | ICD-10-CM

## 2021-10-18 DIAGNOSIS — R18.8 CIRRHOSIS OF LIVER WITH ASCITES, UNSPECIFIED HEPATIC CIRRHOSIS TYPE (H): Primary | ICD-10-CM

## 2021-10-25 ENCOUNTER — LAB (OUTPATIENT)
Dept: LAB | Facility: CLINIC | Age: 66
End: 2021-10-25
Payer: COMMERCIAL

## 2021-10-25 DIAGNOSIS — Z11.59 ENCOUNTER FOR SCREENING FOR OTHER VIRAL DISEASES: ICD-10-CM

## 2021-10-25 LAB — SARS-COV-2 RNA RESP QL NAA+PROBE: NEGATIVE

## 2021-10-25 PROCEDURE — U0003 INFECTIOUS AGENT DETECTION BY NUCLEIC ACID (DNA OR RNA); SEVERE ACUTE RESPIRATORY SYNDROME CORONAVIRUS 2 (SARS-COV-2) (CORONAVIRUS DISEASE [COVID-19]), AMPLIFIED PROBE TECHNIQUE, MAKING USE OF HIGH THROUGHPUT TECHNOLOGIES AS DESCRIBED BY CMS-2020-01-R: HCPCS | Mod: 90 | Performed by: PATHOLOGY

## 2021-10-25 PROCEDURE — U0005 INFEC AGEN DETEC AMPLI PROBE: HCPCS | Mod: 90 | Performed by: PATHOLOGY

## 2021-10-28 ENCOUNTER — HOSPITAL ENCOUNTER (OUTPATIENT)
Facility: AMBULATORY SURGERY CENTER | Age: 66
End: 2021-10-28
Attending: STUDENT IN AN ORGANIZED HEALTH CARE EDUCATION/TRAINING PROGRAM
Payer: COMMERCIAL

## 2021-10-28 ENCOUNTER — ANESTHESIA EVENT (OUTPATIENT)
Dept: SURGERY | Facility: AMBULATORY SURGERY CENTER | Age: 66
End: 2021-10-28
Payer: COMMERCIAL

## 2021-10-28 ENCOUNTER — ANESTHESIA (OUTPATIENT)
Dept: SURGERY | Facility: AMBULATORY SURGERY CENTER | Age: 66
End: 2021-10-28
Payer: COMMERCIAL

## 2021-10-28 VITALS
OXYGEN SATURATION: 99 % | BODY MASS INDEX: 41.48 KG/M2 | HEART RATE: 55 BPM | SYSTOLIC BLOOD PRESSURE: 135 MMHG | DIASTOLIC BLOOD PRESSURE: 69 MMHG | WEIGHT: 249 LBS | TEMPERATURE: 97.8 F | HEIGHT: 65 IN | RESPIRATION RATE: 16 BRPM

## 2021-10-28 VITALS — HEART RATE: 59 BPM

## 2021-10-28 DIAGNOSIS — R07.9 CHEST PAIN, UNSPECIFIED TYPE: ICD-10-CM

## 2021-10-28 DIAGNOSIS — I85.11 ESOPHAGEAL VARICES WITH BLEEDING IN DISEASES CLASSIFIED ELSEWHERE (H): Primary | ICD-10-CM

## 2021-10-28 LAB
GLUCOSE BLDC GLUCOMTR-MCNC: 131 MG/DL (ref 70–99)
UPPER GI ENDOSCOPY: NORMAL

## 2021-10-28 PROCEDURE — 93010 ELECTROCARDIOGRAM REPORT: CPT | Performed by: INTERNAL MEDICINE

## 2021-10-28 PROCEDURE — 82962 GLUCOSE BLOOD TEST: CPT | Performed by: PATHOLOGY

## 2021-10-28 PROCEDURE — 43244 EGD VARICES LIGATION: CPT

## 2021-10-28 RX ORDER — ONDANSETRON 2 MG/ML
4 INJECTION INTRAMUSCULAR; INTRAVENOUS EVERY 6 HOURS PRN
Status: DISCONTINUED | OUTPATIENT
Start: 2021-10-28 | End: 2021-10-29 | Stop reason: HOSPADM

## 2021-10-28 RX ORDER — NALOXONE HYDROCHLORIDE 0.4 MG/ML
0.2 INJECTION, SOLUTION INTRAMUSCULAR; INTRAVENOUS; SUBCUTANEOUS
Status: DISCONTINUED | OUTPATIENT
Start: 2021-10-28 | End: 2021-10-29 | Stop reason: HOSPADM

## 2021-10-28 RX ORDER — SODIUM CHLORIDE, SODIUM LACTATE, POTASSIUM CHLORIDE, CALCIUM CHLORIDE 600; 310; 30; 20 MG/100ML; MG/100ML; MG/100ML; MG/100ML
INJECTION, SOLUTION INTRAVENOUS CONTINUOUS
Status: DISCONTINUED | OUTPATIENT
Start: 2021-10-28 | End: 2021-10-28 | Stop reason: HOSPADM

## 2021-10-28 RX ORDER — PROPOFOL 10 MG/ML
INJECTION, EMULSION INTRAVENOUS CONTINUOUS PRN
Status: DISCONTINUED | OUTPATIENT
Start: 2021-10-28 | End: 2021-10-28

## 2021-10-28 RX ORDER — ONDANSETRON 4 MG/1
4 TABLET, ORALLY DISINTEGRATING ORAL EVERY 6 HOURS PRN
Status: DISCONTINUED | OUTPATIENT
Start: 2021-10-28 | End: 2021-10-29 | Stop reason: HOSPADM

## 2021-10-28 RX ORDER — FLUMAZENIL 0.1 MG/ML
0.2 INJECTION, SOLUTION INTRAVENOUS
Status: DISCONTINUED | OUTPATIENT
Start: 2021-10-28 | End: 2021-10-29 | Stop reason: HOSPADM

## 2021-10-28 RX ORDER — PROCHLORPERAZINE MALEATE 5 MG
5 TABLET ORAL EVERY 6 HOURS PRN
Status: DISCONTINUED | OUTPATIENT
Start: 2021-10-28 | End: 2021-10-29 | Stop reason: HOSPADM

## 2021-10-28 RX ORDER — ONDANSETRON 2 MG/ML
4 INJECTION INTRAMUSCULAR; INTRAVENOUS
Status: DISCONTINUED | OUTPATIENT
Start: 2021-10-28 | End: 2021-10-28 | Stop reason: HOSPADM

## 2021-10-28 RX ORDER — LIDOCAINE 40 MG/G
CREAM TOPICAL
Status: DISCONTINUED | OUTPATIENT
Start: 2021-10-28 | End: 2021-10-28 | Stop reason: HOSPADM

## 2021-10-28 RX ORDER — NALOXONE HYDROCHLORIDE 0.4 MG/ML
0.4 INJECTION, SOLUTION INTRAMUSCULAR; INTRAVENOUS; SUBCUTANEOUS
Status: DISCONTINUED | OUTPATIENT
Start: 2021-10-28 | End: 2021-10-29 | Stop reason: HOSPADM

## 2021-10-28 RX ORDER — SUCRALFATE ORAL 1 G/10ML
1 SUSPENSION ORAL 4 TIMES DAILY PRN
Qty: 420 ML | Refills: 1 | Status: SHIPPED | OUTPATIENT
Start: 2021-10-28 | End: 2022-01-24

## 2021-10-28 RX ORDER — PROPOFOL 10 MG/ML
INJECTION, EMULSION INTRAVENOUS PRN
Status: DISCONTINUED | OUTPATIENT
Start: 2021-10-28 | End: 2021-10-28

## 2021-10-28 RX ADMIN — SODIUM CHLORIDE, SODIUM LACTATE, POTASSIUM CHLORIDE, CALCIUM CHLORIDE: 600; 310; 30; 20 INJECTION, SOLUTION INTRAVENOUS at 14:57

## 2021-10-28 RX ADMIN — PROPOFOL 70 MG: 10 INJECTION, EMULSION INTRAVENOUS at 15:00

## 2021-10-28 RX ADMIN — PROPOFOL 200 MCG/KG/MIN: 10 INJECTION, EMULSION INTRAVENOUS at 15:00

## 2021-10-28 ASSESSMENT — MIFFLIN-ST. JEOR: SCORE: 1667.4

## 2021-10-28 NOTE — ANESTHESIA PREPROCEDURE EVALUATION
Anesthesia Pre-Procedure Evaluation    Patient: Chantelle Sutton   MRN: [id number] : 1955        Preoperative Diagnosis: Esophageal varices with bleeding in diseases classified elsewhere (H) [I85.11]    Procedure : Procedure(s):  ESOPHAGOGASTRODUODENOSCOPY (EGD)          Past Medical History:   Diagnosis Date     Diabetes (H)      Hepatitis       Past Surgical History:   Procedure Laterality Date     COLONOSCOPY N/A 2021    Procedure: COLONOSCOPY, WITH POLYPECTOMY AND BIOPSY;  Surgeon: Linda Burton MD;  Location: [facility] OR      Allergies   Allergen Reactions     Doxycycline Diarrhea      Social History     Tobacco Use     Smoking status: Never Smoker     Smokeless tobacco: Never Used   Substance Use Topics     Alcohol use: Not Currently     Comment: Last drink was 30+ years ago      Wt Readings from Last 1 Encounters:   10/28/21 112.9 kg (249 lb)        Anesthesia Evaluation            ROS/MED HX  ENT/Pulmonary:  - neg pulmonary ROS     Neurologic:  - neg neurologic ROS     Cardiovascular:  - neg cardiovascular ROS     METS/Exercise Tolerance:     Hematologic:  - neg hematologic  ROS     Musculoskeletal:  - neg musculoskeletal ROS     GI/Hepatic: Comment: DINH - neg GI/hepatic ROS     Renal/Genitourinary:  - neg Renal ROS     Endo:  - neg endo ROS   (+) type II DM, Obesity,     Psychiatric/Substance Use:  - neg psychiatric ROS     Infectious Disease:  - neg infectious disease ROS     Malignancy:  - neg malignancy ROS     Other:  - neg other ROS          Physical Exam    Airway  airway exam normal      Mallampati: II   TM distance: > 3 FB   Neck ROM: full   Mouth opening: > 3 cm    Respiratory Devices and Support         Dental  no notable dental history         Cardiovascular          Rhythm and rate: regular and normal     Pulmonary   pulmonary exam normal        breath sounds clear to auscultation           OUTSIDE LABS:  CBC:   Lab Results   Component Value Date    WBC 3.9 (L) 2021    HGB 10.0  (L) 07/09/2021    HCT 31.4 (L) 07/09/2021    PLT 77 (L) 07/09/2021     BMP:   Lab Results   Component Value Date     07/09/2021    POTASSIUM 3.7 07/09/2021    POTASSIUM 3.9 01/19/2021    CHLORIDE 107 07/09/2021    CO2 26 07/09/2021    BUN 13 07/09/2021    CR 0.58 07/09/2021    CR 0.64 01/19/2021     (H) 07/29/2021     (H) 07/09/2021     COAGS:   Lab Results   Component Value Date    INR 1.26 (H) 07/09/2021     POC: No results found for: BGM, HCG, HCGS  HEPATIC:   Lab Results   Component Value Date    ALBUMIN 3.0 (L) 07/09/2021    PROTTOTAL 8.0 07/09/2021    ALT 24 07/09/2021    AST 25 07/09/2021    ALKPHOS 100 07/09/2021    BILITOTAL 0.5 07/09/2021     OTHER:   Lab Results   Component Value Date    A1C 6.3 (A) 01/19/2021    SHAD 8.6 07/09/2021    PHOS 3.2 07/09/2021    TSH 3.39 07/09/2021       Anesthesia Plan    ASA Status:  3   NPO Status:  NPO Appropriate    Anesthesia Type: MAC.     - Reason for MAC: immobility needed, straight local not clinically adequate   Induction: Intravenous.   Maintenance: TIVA.        Consents    Anesthesia Plan(s) and associated risks, benefits, and realistic alternatives discussed. Questions answered and patient/representative(s) expressed understanding.     - Discussed with:  Patient      - Extended Intubation/Ventilatory Support Discussed: No.      - Patient is DNR/DNI Status: No    Use of blood products discussed: No .     Postoperative Care    Pain management: IV analgesics, Oral pain medications, Multi-modal analgesia.   PONV prophylaxis: Ondansetron (or other 5HT-3), Background Propofol Infusion     Comments:                Shabbir Khan MD

## 2021-10-28 NOTE — H&P
Chantelle Sutton  [id number]  female  66 year old      Reason for procedure/surgery: EGD to follow up varices    Patient Active Problem List   Diagnosis     Controlled type 2 diabetes mellitus without complication, with long-term current use of insulin (H)     H/O total hysterectomy     DINH (nonalcoholic steatohepatitis)     Thrombocytopenia (H)     Morbid obesity (H)       Past Surgical History:    Past Surgical History:   Procedure Laterality Date     COLONOSCOPY N/A 7/29/2021    Procedure: COLONOSCOPY, WITH POLYPECTOMY AND BIOPSY;  Surgeon: Linda Burton MD;  [place] [facility] [place]       Past Medical History:   Past Medical History:   Diagnosis Date     Diabetes (H)      Hepatitis        Social History:   Social History     Tobacco Use     Smoking status: Never Smoker     Smokeless tobacco: Never Used   Substance Use Topics     Alcohol use: Not Currently     Comment: Last drink was 30+ years ago       Family History: History reviewed. No pertinent family history.    Allergies:   Allergies   Allergen Reactions     Doxycycline Diarrhea       Active Medications:   Current Outpatient Medications   Medication Sig Dispense Refill     furosemide (LASIX) 20 MG tablet Take 20 mg by mouth       LANTUS SOLOSTAR 100 UNIT/ML soln INJECT 35 UNITS UNDER THE SKIN EVERY MORNING, AND 30 UNITS AT BEDTIME       metFORMIN (GLUCOPHAGE) 1000 MG tablet Take 1,000 mg by mouth       nadolol (CORGARD) 20 MG tablet Take 20 mg by mouth       pantoprazole (PROTONIX) 40 MG EC tablet Take 40 mg by mouth       spironolactone (ALDACTONE) 25 MG tablet Take 1 tablet (25 mg) by mouth daily 30 tablet 3     ACCU-CHEK GUIDE test strip AS DIRECTED. TEST 3 TIMES PER DAY.       bisacodyl (DULCOLAX) 5 MG EC tablet Take as directed. One day prior to exam at 10:00am take 2 tablets 2 tablet 0     Blood Glucose Monitoring Suppl (ACCU-CHEK GUIDE) w/Device KIT DISPENSE METER, TEST STRIPS, LANCETS COVERED BY PT INS. E11.9 IDDM TYPE II   TEST 3 TIMES/DAY.        "insulin pen needle (B-D U/F) 31G X 5 MM miscellaneous ONCE A DAY WITH LANTUS INJECTION       magnesium citrate solution Take as directed. Two days prior to exam drink 10oz bottle of magnesium citrate at 4:00pm 296 mL 0       Systemic Review:   CONSTITUTIONAL: NEGATIVE for fever, chills, change in weight  ENT/MOUTH: NEGATIVE for ear, mouth and throat problems  RESP: NEGATIVE for significant cough or SOB  CV: NEGATIVE for chest pain, palpitations or peripheral edema    Physical Examination:   Vital Signs: BP (!) 142/73   Pulse 57   Temp (!) 96.4  F (35.8  C) (Temporal)   Resp 18   Ht 1.638 m (5' 4.5\")   Wt 112.9 kg (249 lb)   SpO2 97%   BMI 42.08 kg/m    GENERAL: healthy, alert and no distress  NECK: no adenopathy, no asymmetry, masses, or scars  RESP: lungs clear to auscultation - no rales, rhonchi or wheezes  CV: regular rate and rhythm, normal S1 S2, no S3 or S4, no murmur, click or rub, no peripheral edema and peripheral pulses strong  ABDOMEN: soft, nontender, no hepatosplenomegaly, no masses and bowel sounds normal  MS: no gross musculoskeletal defects noted, no edema      Plan: Appropriate to proceed as scheduled.      Linda Burton MD  10/28/2021    PCP:  Kellie Breen    "

## 2021-10-28 NOTE — ANESTHESIA CARE TRANSFER NOTE
Patient: Chantelle Sutton    Procedure: Procedure(s):  ESOPHAGOGASTRODUODENOSCOPY, WITH BANDING OF VARICES       Diagnosis: Esophageal varices with bleeding in diseases classified elsewhere (H) [I85.11]  Diagnosis Additional Information: No value filed.    Anesthesia Type:   MAC     Note:    Oropharynx: spontaneously breathing  Level of Consciousness: awake  Oxygen Supplementation: room air    Independent Airway: airway patency satisfactory and stable  Dentition: dentition unchanged  Vital Signs Stable: post-procedure vital signs reviewed and stable  Report to RN Given: handoff report given  Patient transferred to: Phase II    Handoff Report: Identifed the Patient, Identified the Reponsible Provider, Reviewed the pertinent medical history, Discussed the surgical course, Reviewed Intra-OP anesthesia mangement and issues during anesthesia, Set expectations for post-procedure period and Allowed opportunity for questions and acknowledgement of understanding      Vitals:  Vitals Value Taken Time   /63    Temp 97.3    Pulse 57    Resp     SpO2 97        Electronically Signed By: NANETTE Wong CRNA  October 28, 2021  3:37 PM

## 2021-10-28 NOTE — ANESTHESIA POSTPROCEDURE EVALUATION
Patient: Chantelle Sutton    Procedure: Procedure(s):  ESOPHAGOGASTRODUODENOSCOPY, WITH BANDING OF VARICES       Diagnosis:Esophageal varices with bleeding in diseases classified elsewhere (H) [I85.11]  Diagnosis Additional Information: No value filed.    Anesthesia Type:  MAC    Note:  Disposition: Outpatient   Postop Pain Control: Uneventful            Sign Out: Well controlled pain   PONV: No   Neuro/Psych: Uneventful            Sign Out: Acceptable/Baseline neuro status   Airway/Respiratory: Uneventful            Sign Out: Acceptable/Baseline resp. status   CV/Hemodynamics: Uneventful            Sign Out: Acceptable CV status   Other NRE: NONE   DID A NON-ROUTINE EVENT OCCUR? No           Last vitals:  Vitals Value Taken Time   /69 10/28/21 1550   Temp 36.6  C (97.8  F) 10/28/21 1550   Pulse 55 10/28/21 1550   Resp 16 10/28/21 1550   SpO2 99 % 10/28/21 1550       Electronically Signed By: Shabbir Khan MD  October 28, 2021  4:07 PM

## 2021-10-29 ENCOUNTER — OFFICE VISIT (OUTPATIENT)
Dept: GASTROENTEROLOGY | Facility: CLINIC | Age: 66
End: 2021-10-29
Attending: STUDENT IN AN ORGANIZED HEALTH CARE EDUCATION/TRAINING PROGRAM
Payer: COMMERCIAL

## 2021-10-29 ENCOUNTER — ANCILLARY PROCEDURE (OUTPATIENT)
Dept: ULTRASOUND IMAGING | Facility: CLINIC | Age: 66
End: 2021-10-29
Attending: STUDENT IN AN ORGANIZED HEALTH CARE EDUCATION/TRAINING PROGRAM
Payer: COMMERCIAL

## 2021-10-29 ENCOUNTER — LAB (OUTPATIENT)
Dept: LAB | Facility: CLINIC | Age: 66
End: 2021-10-29
Payer: COMMERCIAL

## 2021-10-29 VITALS
TEMPERATURE: 97.9 F | WEIGHT: 249.1 LBS | OXYGEN SATURATION: 96 % | HEART RATE: 53 BPM | BODY MASS INDEX: 42.1 KG/M2 | SYSTOLIC BLOOD PRESSURE: 103 MMHG | DIASTOLIC BLOOD PRESSURE: 66 MMHG

## 2021-10-29 DIAGNOSIS — K75.81 NASH (NONALCOHOLIC STEATOHEPATITIS): ICD-10-CM

## 2021-10-29 DIAGNOSIS — E66.01 MORBID OBESITY (H): ICD-10-CM

## 2021-10-29 DIAGNOSIS — K74.60 CIRRHOSIS OF LIVER WITH ASCITES, UNSPECIFIED HEPATIC CIRRHOSIS TYPE (H): ICD-10-CM

## 2021-10-29 DIAGNOSIS — R18.8 CIRRHOSIS OF LIVER WITH ASCITES, UNSPECIFIED HEPATIC CIRRHOSIS TYPE (H): ICD-10-CM

## 2021-10-29 DIAGNOSIS — K75.81 NASH (NONALCOHOLIC STEATOHEPATITIS): Primary | ICD-10-CM

## 2021-10-29 DIAGNOSIS — I85.11 ESOPHAGEAL VARICES WITH BLEEDING IN DISEASES CLASSIFIED ELSEWHERE (H): ICD-10-CM

## 2021-10-29 LAB
ALBUMIN SERPL-MCNC: 3.1 G/DL (ref 3.4–5)
ALP SERPL-CCNC: 107 U/L (ref 40–150)
ALT SERPL W P-5'-P-CCNC: 28 U/L (ref 0–50)
ANION GAP SERPL CALCULATED.3IONS-SCNC: 2 MMOL/L (ref 3–14)
AST SERPL W P-5'-P-CCNC: 24 U/L (ref 0–45)
BILIRUB DIRECT SERPL-MCNC: 0.2 MG/DL (ref 0–0.2)
BILIRUB SERPL-MCNC: 0.8 MG/DL (ref 0.2–1.3)
BUN SERPL-MCNC: 10 MG/DL (ref 7–30)
CALCIUM SERPL-MCNC: 9.1 MG/DL (ref 8.5–10.1)
CHLORIDE BLD-SCNC: 110 MMOL/L (ref 94–109)
CO2 SERPL-SCNC: 29 MMOL/L (ref 20–32)
CREAT SERPL-MCNC: 0.66 MG/DL (ref 0.52–1.04)
ERYTHROCYTE [DISTWIDTH] IN BLOOD BY AUTOMATED COUNT: 15.5 % (ref 10–15)
GFR SERPL CREATININE-BSD FRML MDRD: >90 ML/MIN/1.73M2
GLUCOSE BLD-MCNC: 107 MG/DL (ref 70–99)
HCT VFR BLD AUTO: 33.9 % (ref 35–47)
HGB BLD-MCNC: 10.7 G/DL (ref 11.7–15.7)
INR PPP: 1.27 (ref 0.85–1.15)
MCH RBC QN AUTO: 27.9 PG (ref 26.5–33)
MCHC RBC AUTO-ENTMCNC: 31.6 G/DL (ref 31.5–36.5)
MCV RBC AUTO: 88 FL (ref 78–100)
PLATELET # BLD AUTO: 61 10E3/UL (ref 150–450)
POTASSIUM BLD-SCNC: 4.4 MMOL/L (ref 3.4–5.3)
PROT SERPL-MCNC: 7.8 G/DL (ref 6.8–8.8)
RBC # BLD AUTO: 3.84 10E6/UL (ref 3.8–5.2)
SODIUM SERPL-SCNC: 141 MMOL/L (ref 133–144)
WBC # BLD AUTO: 3.7 10E3/UL (ref 4–11)

## 2021-10-29 PROCEDURE — 82248 BILIRUBIN DIRECT: CPT | Performed by: PATHOLOGY

## 2021-10-29 PROCEDURE — 99214 OFFICE O/P EST MOD 30 MIN: CPT | Performed by: STUDENT IN AN ORGANIZED HEALTH CARE EDUCATION/TRAINING PROGRAM

## 2021-10-29 PROCEDURE — 85610 PROTHROMBIN TIME: CPT | Performed by: PATHOLOGY

## 2021-10-29 PROCEDURE — 80053 COMPREHEN METABOLIC PANEL: CPT | Performed by: PATHOLOGY

## 2021-10-29 PROCEDURE — G0463 HOSPITAL OUTPT CLINIC VISIT: HCPCS

## 2021-10-29 PROCEDURE — 36415 COLL VENOUS BLD VENIPUNCTURE: CPT | Performed by: PATHOLOGY

## 2021-10-29 PROCEDURE — 76705 ECHO EXAM OF ABDOMEN: CPT | Mod: GC | Performed by: RADIOLOGY

## 2021-10-29 PROCEDURE — 85027 COMPLETE CBC AUTOMATED: CPT | Performed by: PATHOLOGY

## 2021-10-29 NOTE — LETTER
"    10/29/2021         RE: Chantelle Sutton  No Permanent Address As Of Yet  [facility] [address]        Dear Colleague,    Thank you for referring your patient, Chantelle Sutton, to the [facility] [place]. Please see a copy of my visit note below.    [facility] Return Patient Visit    Date of Visit: October 29th, 2021    Reason for referral: Consult for decompensated DINH cirrhosis    Subjective: Ms. 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 Lynn Zucker [facility]). Went on milk thistle and vitamin E. Reported some scarring on imaging, not sure if she was told she had cirrhosis, but underwent HCC screening every 6 months. Never had a liver bx.     She was in [place] May 2021, felt nauseated and developed hematemesis - underwent EGD that showed EVs that were banded x 6 and GVs that did not have stigmata of bleeding. She had a CT scan at that time - may have had ascites but not enough to tap. MELD apparently 15.     She returned back to [place].     Was started on lasix 20 mg daily a few weeks ago. She underwent her first paracentesis 7/7 - 2 L removed, she is not sure if there was more to remove. Not sent for diagnostic sampling.     Weights 251 lbs, was 270 lbs earlier this year.     Interval Events:   - EGD 7/2021 and 10/2021 with grade 2 EVs s/p banding. HR at goal on nadolol  - Reports feeling tired, has been on going since her variceal bleed. Also taking care of her sister who is quite ill in [place], this is requiring a lot of extra care. They are trying to get her to a rehab in [place], trying to buy a house up here  - They are going back to [place] on 11/1, staying down indefinitely until above issues are sorted out  - Will " noticed some pain and hardness around her belly button at times that improves on its own, no worsening swelling. Taking low dose diuretics  - No HE    ROS: 14 point ROS negative except for positives noted in HPI.    PMHx:  Past Medical History:   Diagnosis Date     Diabetes (H)      Hepatitis    DM on insulin and metformin    PSHx:  C section   JACOB - tubes and ovaries removed, has her cervix left    FamHx:  Brother was a drink with cirrhosis, pancreatic cancer  No family history of liver disease, liver cancer    SocHx:  Social History     Socioeconomic History     Marital status:      Spouse name: Not on file     Number of children: Not on file     Years of education: Not on file     Highest education level: Not on file   Occupational History     Not on file   Tobacco Use     Smoking status: Never Smoker     Smokeless tobacco: Never Used   Substance and Sexual Activity     Alcohol use: Not Currently     Comment: Last drink was 30+ years ago     Drug use: Never     Sexual activity: Not on file   Other Topics Concern     Parent/sibling w/ CABG, MI or angioplasty before 65F 55M? Not Asked   Social History Narrative     Not on file     Social Determinants of Health     Financial Resource Strain:      Difficulty of Paying Living Expenses:    Food Insecurity:      Worried About Running Out of Food in the Last Year:      Ran Out of Food in the Last Year:    Transportation Needs:      Lack of Transportation (Medical):      Lack of Transportation (Non-Medical):    Physical Activity:      Days of Exercise per Week:      Minutes of Exercise per Session:    Stress:      Feeling of Stress :    Social Connections:      Frequency of Communication with Friends and Family:      Frequency of Social Gatherings with Friends and Family:      Attends [employer] Services:      Active Member of Clubs or Organizations:      Attends Club or Organization Meetings:      Marital Status:    Intimate Partner Violence:      Fear of Current  or Ex-Partner:      Emotionally Abused:      Physically Abused:      Sexually Abused:     lives with spouse.  Currently alternating between [place] and [place], plans to move her sister from [place] up here to [place]- date TBD when they buy a house;  Current residence in [place], [place] expires 7/31, will be staying in [place] while sister recovers from illness with eventual plan to move back to [place] as soon as housing established in [place].    Rare alcohol use in the past    Medications:  Current Outpatient Medications   Medication     ACCU-CHEK GUIDE test strip     Blood Glucose Monitoring Suppl (ACCU-CHEK GUIDE) w/Device KIT     furosemide (LASIX) 20 MG tablet     insulin pen needle (B-D U/F) 31G X 5 MM miscellaneous     LANTUS SOLOSTAR 100 UNIT/ML soln     metFORMIN (GLUCOPHAGE) 1000 MG tablet     nadolol (CORGARD) 20 MG tablet     pantoprazole (PROTONIX) 40 MG EC tablet     spironolactone (ALDACTONE) 25 MG tablet     sucralfate (CARAFATE) 1 GM/10ML suspension     No current facility-administered medications for this visit.   Lasix 20 mg daily - started a few weeks ago for LE edema and abdominal swelling  PPI for GERD  Blood sugars ok, A1c good    Allergies:  Allergies   Allergen Reactions     Doxycycline Diarrhea       Objective:  /66   Pulse 53   Temp 97.9  F (36.6  C)   Wt 113 kg (249 lb 1.6 oz)   SpO2 96%   BMI 42.10 kg/m    Constitutional: pleasant woman in NAD  Eyes: non icteric  Respiratory: Normal respiratory excursion   MSK: normal range of motion of visualized extremities  Abd: Obese, minimal ascites present  Skin: No jaundice  Psychiatric: normal mood and orientation    Labs:  Last Comprehensive Metabolic Panel:  Sodium   Date Value Ref Range Status   10/29/2021 141 133 - 144 mmol/L Final   07/09/2021 139 133 - 144 mmol/L Final     Potassium   Date Value Ref Range Status   10/29/2021 4.4 3.4 - 5.3 mmol/L Final   07/09/2021 3.7 3.4 - 5.3 mmol/L Final     Chloride   Date Value Ref Range Status   10/29/2021 110 (H) 94 -  109 mmol/L Final   07/09/2021 107 94 - 109 mmol/L Final     Carbon Dioxide   Date Value Ref Range Status   07/09/2021 26 20 - 32 mmol/L Final     Carbon Dioxide (CO2)   Date Value Ref Range Status   10/29/2021 29 20 - 32 mmol/L Final     Anion Gap   Date Value Ref Range Status   10/29/2021 2 (L) 3 - 14 mmol/L Final   07/09/2021 6 3 - 14 mmol/L Final     Glucose   Date Value Ref Range Status   10/29/2021 107 (H) 70 - 99 mg/dL Final   07/09/2021 181 (H) 70 - 99 mg/dL Final     Urea Nitrogen   Date Value Ref Range Status   10/29/2021 10 7 - 30 mg/dL Final   07/09/2021 13 7 - 30 mg/dL Final     Creatinine   Date Value Ref Range Status   10/29/2021 0.66 0.52 - 1.04 mg/dL Final   07/09/2021 0.58 0.52 - 1.04 mg/dL Final     GFR Estimate   Date Value Ref Range Status   10/29/2021 >90 >60 mL/min/1.73m2 Final     Comment:     As of July 11, 2021, eGFR is calculated by the CKD-EPI creatinine equation, without race adjustment. eGFR can be influenced by muscle mass, exercise, and diet. The reported eGFR is an estimation only and is only applicable if the renal function is stable.   07/09/2021 >90 >60 mL/min/[1.73_m2] Final     Comment:     Non  GFR Calc  Starting 12/18/2018, serum creatinine based estimated GFR (eGFR) will be   calculated using the Chronic Kidney Disease Epidemiology Collaboration   (CKD-EPI) equation.       Calcium   Date Value Ref Range Status   10/29/2021 9.1 8.5 - 10.1 mg/dL Final   07/09/2021 8.6 8.5 - 10.1 mg/dL Final     Bilirubin Total   Date Value Ref Range Status   10/29/2021 0.8 0.2 - 1.3 mg/dL Final   07/09/2021 0.5 0.2 - 1.3 mg/dL Final     Alkaline Phosphatase   Date Value Ref Range Status   10/29/2021 107 40 - 150 U/L Final   07/09/2021 100 40 - 150 U/L Final     ALT   Date Value Ref Range Status   10/29/2021 28 0 - 50 U/L Final   07/09/2021 24 0 - 50 U/L Final     AST   Date Value Ref Range Status   10/29/2021 24 0 - 45 U/L Final   07/09/2021 25 0 - 45 U/L Final       No results  found for: WBC  No results found for: RBC  No results found for: HGB  No results found for: HCT  No results found for: MCV  No results found for: MCH  No results found for: MCHC  No results found for: RDW  No results found for: PLT    INR   Date Value Ref Range Status   10/29/2021 1.27 (H) 0.85 - 1.15 Final   07/09/2021 1.26 (H) 0.86 - 1.14 Final        MELD-Na score: 9 at 10/29/2021  9:15 AM  MELD score: 9 at 10/29/2021  9:15 AM  Calculated from:  Serum Creatinine: 0.66 mg/dL (Using min of 1 mg/dL) at 10/29/2021  9:15 AM  Serum Sodium: 141 mmol/L (Using max of 137 mmol/L) at 10/29/2021  9:15 AM  Total Bilirubin: 0.8 mg/dL (Using min of 1 mg/dL) at 10/29/2021  9:15 AM  INR(ratio): 1.27 at 10/29/2021  9:15 AM  Age: 66 years    Imaging:     RUQ US 6/15/2021    GALLBLADDER: The gallbladder is distended with evidence of wall thickening as well as ascites creating pericholecystic fluid. Redemonstrated is a nonshadowing 4 mm polyp without stones.    BILE DUCTS: No biliary dilatation. The common duct measures 4 mm.    LIVER: Coarsened echotexture, suggesting underlying cirrhosis. Nodular contour. No focal mass.    RIGHT KIDNEY: Normal size. Normal echogenicity with no hydronephrosis or mass.     LEFT KIDNEY: Normal size. Normal echogenicity with no hydronephrosis or mass.     SPLEEN: Splenomegaly measuring 21 cm.    PANCREAS: Poorly visualized.    AORTA: Normal in caliber.     IVC: Normal where visualized.    Moderate ascites.    IMPRESSION:  1.  Nodular cirrhotic liver with ascites.    2.  Splenomegaly may be related to portal hypertension.    3.  Wall thickening within the gallbladder may be related to cirrhosis, ascites, and incomplete distention. Sonographic García sign is negative. Gallbladder polyp unchanged.    Endoscopy:    EGD 5/12/2021  EV banding x 6  GVs no stigmata of bleeding    EGD 7/29   Grade 2 EVs s/p banding x 3    EGD 10/28   Grade 2 EVs s/p banding x2  PHG  ? Nodular GAVE    Colonoscopy  7/2021    Findings:        The digital rectal exam findings include decreased sphincter tone.        An area of moderately congested mucosa was found in the recto-sigmoid        colon and in the descending colon. Contact oozing was noted.        An area of mildly congested mucosa was found in the transverse colon, in        the ascending colon and in the cecum.        A 2 mm polyp was found in the cecum. The polyp was sessile. The polyp        was removed with a jumbo cold forceps. Resection and retrieval were        complete.        Two sessile polyps were found in the transverse colon. The polyps were 1        to 2 mm in size. These polyps were removed with a jumbo cold forceps.        Resection and retrieval were complete.        Two sessile polyps were found in the rectum. The polyps were 1 to 2 mm        in size. These polyps were removed with a jumbo cold forceps. Resection        and retrieval were complete.        Multiple medium-sized diffuse angioectasias without bleeding were found        in the entire colon. Angioectasias more prevalent in right colon. Flat        erythematous areas also seen, thought to be dense angioectasias v. scope        trauma. More prevalent on right colon. Edge of larger flat erythematous        area biopsied. Biopsies were taken with a cold forceps for histology.        Large, non-bleeding rectal varices were found.        The exam was otherwise without abnormality.                                                                                     Impression:          - Decreased sphincter tone found on digital rectal exam.                        - Congested mucosa in the recto-sigmoid colon and in the                        descending colon.                        - Congested mucosa in the transverse colon, in the                        ascending colon and in the cecum.                        - One 2 mm polyp in the cecum, removed with a jumbo cold                        " forceps. Resected and retrieved.                        - Two 1 to 2 mm polyps in the transverse colon, removed                        with a jumbo cold forceps. Resected and retrieved.                        - Two 1 to 2 mm polyps in the rectum, removed with a                        jumbo cold forceps. Resected and retrieved.                        - Multiple non-bleeding colonic angioectasias. Biopsied.                        - Rectal varices.                        - The examination was otherwise normal.     A. CECAL POLYP:  -Colonic mucosa with lymphoid aggregate  -No evidence of neoplastic polyp     B. COLON ERYTHEMA BIOPSIES:  -Colonic mucosa with no significant histopathologic abnormality  -No evidence of chronic active or microscopic colitis     C. TRANSVERSE COLON POLYPS X2:  -Colonic mucosa with no significant histopathologic abnormality  -No evidence of neoplastic polyp     D. RECTAL POLYPS X2:  -Hyperplastic polyp(s)    Independently reviewed labs and imaging.     Assessment/Plan: Ms. 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    She has a long history of DINH cirrhosis, recently decompensated for the first time with an apparent esophageal variceal bleed and new ascites. MELD is 9. Started on lasix, required small volume paracentesis 7/2021.     MELD-Na score: 9 at 10/29/2021  9:15 AM  MELD score: 9 at 10/29/2021  9:15 AM  Calculated from:  Serum Creatinine: 0.66 mg/dL (Using min of 1 mg/dL) at 10/29/2021  9:15 AM  Serum Sodium: 141 mmol/L (Using max of 137 mmol/L) at 10/29/2021  9:15 AM  Total Bilirubin: 0.8 mg/dL (Using min of 1 mg/dL) at 10/29/2021  9:15 AM  INR(ratio): 1.27 at 10/29/2021  9:15 AM  Age: 66 years    Given low MELD, transplant not pursued. She has continued to have large varices despite banding. Also at goal with NSBB. Discussed if she still has large varices on her follow up " that are unable to be endoscopically controlled, would refer for TIPS. I think she would be a good TIPS candidate, low MELD, TTE ok, and no history of HE. Also if she does not tolerate NSBB would put higher priority on TIPS. She is in [place] most of the time, left message for inpatient provider to see if he can resume her banding while in [place], since she is only driving up here for her EGDs.     - Continue lasix 20/spironolactone 25 mg daily for ascites  - Continue nadolol for secondary ppx of variceal bleeding - HR at goal. Will recommend repeat EGD 4-6 weeks - here or in [place]. If she gets it done here will try to set up IR visit around that time to discuss TIPS.   - HCC screening - RUQ US 6/2021 without masses, repeat now  - Continue to abstain from alcohol, recommend weight loss given her BMI > 40 would be an issue for her transplant candidacy    RTC 3 months.    Linda Burton MD MS  Hepatology/Liver Transplant  [facility]    GI physician who saw her inpatient in [place] Dr. Shruthi Vargas             Again, thank you for allowing me to participate in the care of your patient.        Sincerely,        Linda Burton MD

## 2021-10-29 NOTE — NURSING NOTE
Chief Complaint   Patient presents with     RECHECK     follow up DINH       /66   Pulse 53   Temp 97.9  F (36.6  C)   Wt 113 kg (249 lb 1.6 oz)   SpO2 96%   BMI 42.10 kg/m        Amber GARLAND, NADEEN

## 2021-10-29 NOTE — PROGRESS NOTES
"[facility] Return Patient Visit    Date of Visit: October 29th, 2021    Reason for referral: Consult for decompensated DNIH cirrhosis    Subjective: Ms. 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 Lynn - [facility]). Went on milk thistle and vitamin E. Reported some scarring on imaging, not sure if she was told she had cirrhosis, but underwent HCC screening every 6 months. Never had a liver bx.     She was in [place] May 2021, felt nauseated and developed hematemesis - underwent EGD that showed EVs that were banded x 6 and GVs that did not have stigmata of bleeding. She had a CT scan at that time - may have had ascites but not enough to tap. MELD apparently 15.     She returned back to [place].     Was started on lasix 20 mg daily a few weeks ago. She underwent her first paracentesis 7/7 - 2 L removed, she is not sure if there was more to remove. Not sent for diagnostic sampling.     Weights 251 lbs, was 270 lbs earlier this year.     Interval Events:   - EGD 7/2021 and 10/2021 with grade 2 EVs s/p banding. HR at goal on nadolol  - Reports feeling tired, has been on going since her variceal bleed. Also taking care of her sister who is quite ill in [place], this is requiring a lot of extra care. They are trying to get her to a rehab in [place], trying to buy a house up here  - They are going back to [place] on 11/1, staying down indefinitely until above issues are sorted out  - Will noticed some pain and hardness around her belly button at times that improves on its own, no worsening swelling. Taking low dose diuretics  - No HE    ROS: 14 point ROS negative except for positives noted in HPI.    PMHx:  Past Medical History:   Diagnosis Date     Diabetes (H)      " Hepatitis    DM on insulin and metformin    PSHx:  C section   JACOB - tubes and ovaries removed, has her cervix left    FamHx:  Brother was a drink with cirrhosis, pancreatic cancer  No family history of liver disease, liver cancer    SocHx:  Social History     Socioeconomic History     Marital status:      Spouse name: Not on file     Number of children: Not on file     Years of education: Not on file     Highest education level: Not on file   Occupational History     Not on file   Tobacco Use     Smoking status: Never Smoker     Smokeless tobacco: Never Used   Substance and Sexual Activity     Alcohol use: Not Currently     Comment: Last drink was 30+ years ago     Drug use: Never     Sexual activity: Not on file   Other Topics Concern     Parent/sibling w/ CABG, MI or angioplasty before 65F 55M? Not Asked   Social History Narrative     Not on file     Social Determinants of Health     Financial Resource Strain:      Difficulty of Paying Living Expenses:    Food Insecurity:      Worried About Running Out of Food in the Last Year:      Ran Out of Food in the Last Year:    Transportation Needs:      Lack of Transportation (Medical):      Lack of Transportation (Non-Medical):    Physical Activity:      Days of Exercise per Week:      Minutes of Exercise per Session:    Stress:      Feeling of Stress :    Social Connections:      Frequency of Communication with Friends and Family:      Frequency of Social Gatherings with Friends and Family:      Attends Cheondo[employer] Services:      Active Member of Clubs or Organizations:      Attends Club or Organization Meetings:      Marital Status:    Intimate Partner Violence:      Fear of Current or Ex-Partner:      Emotionally Abused:      Physically Abused:      Sexually Abused:     lives with spouse.  Currently alternating between [place] and [place], plans to move her sister from [place] up here to [place]- date D when they buy a house;  Current residence in [facility] expires 7/31,  will be staying in [place] while sister recovers from illness with eventual plan to move back to [place] as soon as housing established in [place].    Rare alcohol use in the past    Medications:  Current Outpatient Medications   Medication     ACCU-CHEK GUIDE test strip     Blood Glucose Monitoring Suppl (ACCU-CHEK GUIDE) w/Device KIT     furosemide (LASIX) 20 MG tablet     insulin pen needle (B-D U/F) 31G X 5 MM miscellaneous     LANTUS SOLOSTAR 100 UNIT/ML soln     metFORMIN (GLUCOPHAGE) 1000 MG tablet     nadolol (CORGARD) 20 MG tablet     pantoprazole (PROTONIX) 40 MG EC tablet     spironolactone (ALDACTONE) 25 MG tablet     sucralfate (CARAFATE) 1 GM/10ML suspension     No current facility-administered medications for this visit.   Lasix 20 mg daily - started a few weeks ago for LE edema and abdominal swelling  PPI for GERD  Blood sugars ok, A1c good    Allergies:  Allergies   Allergen Reactions     Doxycycline Diarrhea       Objective:  /66   Pulse 53   Temp 97.9  F (36.6  C)   Wt 113 kg (249 lb 1.6 oz)   SpO2 96%   BMI 42.10 kg/m    Constitutional: pleasant woman in NAD  Eyes: non icteric  Respiratory: Normal respiratory excursion   MSK: normal range of motion of visualized extremities  Abd: Obese, minimal ascites present  Skin: No jaundice  Psychiatric: normal mood and orientation    Labs:  Last Comprehensive Metabolic Panel:  Sodium   Date Value Ref Range Status   10/29/2021 141 133 - 144 mmol/L Final   07/09/2021 139 133 - 144 mmol/L Final     Potassium   Date Value Ref Range Status   10/29/2021 4.4 3.4 - 5.3 mmol/L Final   07/09/2021 3.7 3.4 - 5.3 mmol/L Final     Chloride   Date Value Ref Range Status   10/29/2021 110 (H) 94 - 109 mmol/L Final   07/09/2021 107 94 - 109 mmol/L Final     Carbon Dioxide   Date Value Ref Range Status   07/09/2021 26 20 - 32 mmol/L Final     Carbon Dioxide (CO2)   Date Value Ref Range Status   10/29/2021 29 20 - 32 mmol/L Final     Anion Gap   Date Value Ref Range Status    10/29/2021 2 (L) 3 - 14 mmol/L Final   07/09/2021 6 3 - 14 mmol/L Final     Glucose   Date Value Ref Range Status   10/29/2021 107 (H) 70 - 99 mg/dL Final   07/09/2021 181 (H) 70 - 99 mg/dL Final     Urea Nitrogen   Date Value Ref Range Status   10/29/2021 10 7 - 30 mg/dL Final   07/09/2021 13 7 - 30 mg/dL Final     Creatinine   Date Value Ref Range Status   10/29/2021 0.66 0.52 - 1.04 mg/dL Final   07/09/2021 0.58 0.52 - 1.04 mg/dL Final     GFR Estimate   Date Value Ref Range Status   10/29/2021 >90 >60 mL/min/1.73m2 Final     Comment:     As of July 11, 2021, eGFR is calculated by the CKD-EPI creatinine equation, without race adjustment. eGFR can be influenced by muscle mass, exercise, and diet. The reported eGFR is an estimation only and is only applicable if the renal function is stable.   07/09/2021 >90 >60 mL/min/[1.73_m2] Final     Comment:     Non  GFR Calc  Starting 12/18/2018, serum creatinine based estimated GFR (eGFR) will be   calculated using the Chronic Kidney Disease Epidemiology Collaboration   (CKD-EPI) equation.       Calcium   Date Value Ref Range Status   10/29/2021 9.1 8.5 - 10.1 mg/dL Final   07/09/2021 8.6 8.5 - 10.1 mg/dL Final     Bilirubin Total   Date Value Ref Range Status   10/29/2021 0.8 0.2 - 1.3 mg/dL Final   07/09/2021 0.5 0.2 - 1.3 mg/dL Final     Alkaline Phosphatase   Date Value Ref Range Status   10/29/2021 107 40 - 150 U/L Final   07/09/2021 100 40 - 150 U/L Final     ALT   Date Value Ref Range Status   10/29/2021 28 0 - 50 U/L Final   07/09/2021 24 0 - 50 U/L Final     AST   Date Value Ref Range Status   10/29/2021 24 0 - 45 U/L Final   07/09/2021 25 0 - 45 U/L Final       No results found for: WBC  No results found for: RBC  No results found for: HGB  No results found for: HCT  No results found for: MCV  No results found for: MCH  No results found for: MCHC  No results found for: RDW  No results found for: PLT    INR   Date Value Ref Range Status    10/29/2021 1.27 (H) 0.85 - 1.15 Final   07/09/2021 1.26 (H) 0.86 - 1.14 Final        MELD-Na score: 9 at 10/29/2021  9:15 AM  MELD score: 9 at 10/29/2021  9:15 AM  Calculated from:  Serum Creatinine: 0.66 mg/dL (Using min of 1 mg/dL) at 10/29/2021  9:15 AM  Serum Sodium: 141 mmol/L (Using max of 137 mmol/L) at 10/29/2021  9:15 AM  Total Bilirubin: 0.8 mg/dL (Using min of 1 mg/dL) at 10/29/2021  9:15 AM  INR(ratio): 1.27 at 10/29/2021  9:15 AM  Age: 66 years    Imaging:     RUQ US 6/15/2021    GALLBLADDER: The gallbladder is distended with evidence of wall thickening as well as ascites creating pericholecystic fluid. Redemonstrated is a nonshadowing 4 mm polyp without stones.    BILE DUCTS: No biliary dilatation. The common duct measures 4 mm.    LIVER: Coarsened echotexture, suggesting underlying cirrhosis. Nodular contour. No focal mass.    RIGHT KIDNEY: Normal size. Normal echogenicity with no hydronephrosis or mass.     LEFT KIDNEY: Normal size. Normal echogenicity with no hydronephrosis or mass.     SPLEEN: Splenomegaly measuring 21 cm.    PANCREAS: Poorly visualized.    AORTA: Normal in caliber.     IVC: Normal where visualized.    Moderate ascites.    IMPRESSION:  1.  Nodular cirrhotic liver with ascites.    2.  Splenomegaly may be related to portal hypertension.    3.  Wall thickening within the gallbladder may be related to cirrhosis, ascites, and incomplete distention. Sonographic García sign is negative. Gallbladder polyp unchanged.    Endoscopy:    EGD 5/12/2021  EV banding x 6  GVs no stigmata of bleeding    EGD 7/29   Grade 2 EVs s/p banding x 3    EGD 10/28   Grade 2 EVs s/p banding x2  PHG  ? Nodular GAVE    Colonoscopy 7/2021    Findings:        The digital rectal exam findings include decreased sphincter tone.        An area of moderately congested mucosa was found in the recto-sigmoid        colon and in the descending colon. Contact oozing was noted.        An area of mildly congested mucosa was  found in the transverse colon, in        the ascending colon and in the cecum.        A 2 mm polyp was found in the cecum. The polyp was sessile. The polyp        was removed with a jumbo cold forceps. Resection and retrieval were        complete.        Two sessile polyps were found in the transverse colon. The polyps were 1        to 2 mm in size. These polyps were removed with a jumbo cold forceps.        Resection and retrieval were complete.        Two sessile polyps were found in the rectum. The polyps were 1 to 2 mm        in size. These polyps were removed with a jumbo cold forceps. Resection        and retrieval were complete.        Multiple medium-sized diffuse angioectasias without bleeding were found        in the entire colon. Angioectasias more prevalent in right colon. Flat        erythematous areas also seen, thought to be dense angioectasias v. scope        trauma. More prevalent on right colon. Edge of larger flat erythematous        area biopsied. Biopsies were taken with a cold forceps for histology.        Large, non-bleeding rectal varices were found.        The exam was otherwise without abnormality.                                                                                     Impression:          - Decreased sphincter tone found on digital rectal exam.                        - Congested mucosa in the recto-sigmoid colon and in the                        descending colon.                        - Congested mucosa in the transverse colon, in the                        ascending colon and in the cecum.                        - One 2 mm polyp in the cecum, removed with a jumbo cold                        forceps. Resected and retrieved.                        - Two 1 to 2 mm polyps in the transverse colon, removed                        with a jumbo cold forceps. Resected and retrieved.                        - Two 1 to 2 mm polyps in the rectum, removed with a                        jumbo  "cold forceps. Resected and retrieved.                        - Multiple non-bleeding colonic angioectasias. Biopsied.                        - Rectal varices.                        - The examination was otherwise normal.     A. CECAL POLYP:  -Colonic mucosa with lymphoid aggregate  -No evidence of neoplastic polyp     B. COLON ERYTHEMA BIOPSIES:  -Colonic mucosa with no significant histopathologic abnormality  -No evidence of chronic active or microscopic colitis     C. TRANSVERSE COLON POLYPS X2:  -Colonic mucosa with no significant histopathologic abnormality  -No evidence of neoplastic polyp     D. RECTAL POLYPS X2:  -Hyperplastic polyp(s)    Independently reviewed labs and imaging.     Assessment/Plan: Ms. 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    She has a long history of DINH cirrhosis, recently decompensated for the first time with an apparent esophageal variceal bleed and new ascites. MELD is 9. Started on lasix, required small volume paracentesis 7/2021.     MELD-Na score: 9 at 10/29/2021  9:15 AM  MELD score: 9 at 10/29/2021  9:15 AM  Calculated from:  Serum Creatinine: 0.66 mg/dL (Using min of 1 mg/dL) at 10/29/2021  9:15 AM  Serum Sodium: 141 mmol/L (Using max of 137 mmol/L) at 10/29/2021  9:15 AM  Total Bilirubin: 0.8 mg/dL (Using min of 1 mg/dL) at 10/29/2021  9:15 AM  INR(ratio): 1.27 at 10/29/2021  9:15 AM  Age: 66 years    Given low MELD, transplant not pursued. She has continued to have large varices despite banding. Also at goal with NSBB. Discussed if she still has large varices on her follow up that are unable to be endoscopically controlled, would refer for TIPS. I think she would be a good TIPS candidate, low MELD, TTE ok, and no history of HE. Also if she does not tolerate NSBB would put higher priority on TIPS. She is in [place] most of the time, left message for inpatient " provider to see if he can resume her banding while in [place], since she is only driving up here for her EGDs.     - Continue lasix 20/spironolactone 25 mg daily for ascites  - Continue nadolol for secondary ppx of variceal bleeding - HR at goal. Will recommend repeat EGD 4-6 weeks - here or in [place]. If she gets it done here will try to set up IR visit around that time to discuss TIPS.   - HCC screening - RUQ US 6/2021 without masses, repeat now  - Continue to abstain from alcohol, recommend weight loss given her BMI > 40 would be an issue for her transplant candidacy    RTC 3 months.    Linda Burton MD MS  Hepatology/Liver Transplant  [facility]    GI physician who saw her inpatient in [place] Dr. Shruthi Vargas

## 2021-11-10 LAB
ATRIAL RATE - MUSE: 52 BPM
DIASTOLIC BLOOD PRESSURE - MUSE: NORMAL MMHG
INTERPRETATION ECG - MUSE: NORMAL
P AXIS - MUSE: 32 DEGREES
PR INTERVAL - MUSE: 174 MS
QRS DURATION - MUSE: 98 MS
QT - MUSE: 484 MS
QTC - MUSE: 450 MS
R AXIS - MUSE: 61 DEGREES
SYSTOLIC BLOOD PRESSURE - MUSE: NORMAL MMHG
T AXIS - MUSE: 39 DEGREES
VENTRICULAR RATE- MUSE: 52 BPM

## 2022-01-17 DIAGNOSIS — K75.81 NASH (NONALCOHOLIC STEATOHEPATITIS): Primary | ICD-10-CM

## 2022-01-24 ENCOUNTER — TELEPHONE (OUTPATIENT)
Dept: GASTROENTEROLOGY | Facility: CLINIC | Age: 67
End: 2022-01-24

## 2022-01-24 ENCOUNTER — VIRTUAL VISIT (OUTPATIENT)
Dept: GASTROENTEROLOGY | Facility: CLINIC | Age: 67
End: 2022-01-24
Attending: STUDENT IN AN ORGANIZED HEALTH CARE EDUCATION/TRAINING PROGRAM
Payer: COMMERCIAL

## 2022-01-24 DIAGNOSIS — K74.60 CIRRHOSIS OF LIVER WITH ASCITES, UNSPECIFIED HEPATIC CIRRHOSIS TYPE (H): ICD-10-CM

## 2022-01-24 DIAGNOSIS — R18.8 CIRRHOSIS OF LIVER WITH ASCITES, UNSPECIFIED HEPATIC CIRRHOSIS TYPE (H): ICD-10-CM

## 2022-01-24 DIAGNOSIS — K75.81 NASH (NONALCOHOLIC STEATOHEPATITIS): Primary | ICD-10-CM

## 2022-01-24 PROCEDURE — 99215 OFFICE O/P EST HI 40 MIN: CPT | Mod: GT | Performed by: STUDENT IN AN ORGANIZED HEALTH CARE EDUCATION/TRAINING PROGRAM

## 2022-01-24 ASSESSMENT — PAIN SCALES - GENERAL: PAINLEVEL: NO PAIN (0)

## 2022-01-24 NOTE — TELEPHONE ENCOUNTER
Labs faxed to [facility]. Per pt request. Pt to have labs drawn tomorrow.    Dayanna BATES LPN  [facility]atology Clinic

## 2022-01-24 NOTE — LETTER
"  1/24/2022     RE: Chantelle Sutton  No Permanent Address As Of Yet  [facility] [address]    Dear Colleague,    Thank you for referring your patient, Chantelle Sutton, to the [facility] [place]. Please see a copy of my visit note below.    [facility] Return Patient Visit    Date of Visit: January 24th, 2022    Reason for referral: Consult for decompensated DINH cirrhosis    Subjective: Ms. 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 [facility]). Went on milk thistle and vitamin E. Reported some scarring on imaging, not sure if she was told she had cirrhosis, but underwent HCC screening every 6 months. Never had a liver bx.     She was in [place] May 2021, felt nauseated and developed hematemesis - underwent EGD that showed EVs that were banded x 6 and GVs that did not have stigmata of bleeding. She had a CT scan at that time - may have had ascites but not enough to tap. MELD apparently 15.     She returned back to [place].     Was started on lasix 20 mg daily a few weeks ago. She underwent her first paracentesis 7/7 - 2 L removed, she is not sure if there was more to remove. Not sent for diagnostic sampling.     EGD 7/2021 and 10/2021 with grade 2 EVs s/p banding.  On nadolol.     Interval Events:   -Had a follow-up EGD in [place] where she underwent banding x5.  She has upcoming EGD scheduled in a few weeks  -Doing well, her sister passed away on new years, she spent a lot of time taking care of her sister who is ill  -Ran out of her spironolactone in the fall, noticed her hernia was larger with this.  Has restarted low-dose Lasix and spironolactone is doing well  -No emergency room visits or hospitalizations  -No " hepatic encephalopathy  -Returning to [place] soon, they may live there full-time  -She is hoping to get any surgery soon    ROS: 14 point ROS negative except for positives noted in HPI.    PMHx:  Past Medical History:   Diagnosis Date     Diabetes (H)      Hepatitis    DM on insulin and metformin    PSHx:  C section   JACOB - tubes and ovaries removed, has her cervix left    FamHx:  Brother was a drink with cirrhosis, pancreatic cancer  No family history of liver disease, liver cancer    SocHx:  Social History     Socioeconomic History     Marital status:      Spouse name: Not on file     Number of children: Not on file     Years of education: Not on file     Highest education level: Not on file   Occupational History     Not on file   Tobacco Use     Smoking status: Never Smoker     Smokeless tobacco: Never Used   Substance and Sexual Activity     Alcohol use: Not Currently     Comment: Last drink was 30+ years ago     Drug use: Never     Sexual activity: Not on file   Other Topics Concern     Parent/sibling w/ CABG, MI or angioplasty before 65F 55M? Not Asked   Social History Narrative     Not on file     Social Determinants of Health     Financial Resource Strain: Not on file   Food Insecurity: Not on file   Transportation Needs: Not on file   Physical Activity: Not on file   Stress: Not on file   Social Connections: Not on file   Intimate Partner Violence: Not on file   Housing Stability: Not on file    lives with spouse.  Currently alternating between [place] and [place]  Rare alcohol use in the past    Medications:  Current Outpatient Medications   Medication     ACCU-CHEK GUIDE test strip     Blood Glucose Monitoring Suppl (ACCU-CHEK GUIDE) w/Device KIT     furosemide (LASIX) 20 MG tablet     insulin pen needle (B-D U/F) 31G X 5 MM miscellaneous     LANTUS SOLOSTAR 100 UNIT/ML soln     metFORMIN (GLUCOPHAGE) 1000 MG tablet     nadolol (CORGARD) 20 MG tablet     pantoprazole (PROTONIX) 40 MG EC tablet      spironolactone (ALDACTONE) 25 MG tablet     No current facility-administered medications for this visit.   Lasix 20 mg daily - started a few weeks ago for LE edema and abdominal swelling  PPI for GERD  Blood sugars ok, A1c good    Allergies:  Allergies   Allergen Reactions     Doxycycline Diarrhea       Objective:  There were no vitals taken for this visit.  Constitutional: pleasant woman in NAD  Eyes: non icteric  Respiratory: Normal respiratory excursion   MSK: normal range of motion of visualized extremities  Abd: Obese, minimal ascites present  Skin: No jaundice  Psychiatric: normal mood and orientation    Labs:  Last Comprehensive Metabolic Panel:  Sodium   Date Value Ref Range Status   10/29/2021 141 133 - 144 mmol/L Final   07/09/2021 139 133 - 144 mmol/L Final     Potassium   Date Value Ref Range Status   10/29/2021 4.4 3.4 - 5.3 mmol/L Final   07/09/2021 3.7 3.4 - 5.3 mmol/L Final     Chloride   Date Value Ref Range Status   10/29/2021 110 (H) 94 - 109 mmol/L Final   07/09/2021 107 94 - 109 mmol/L Final     Carbon Dioxide   Date Value Ref Range Status   07/09/2021 26 20 - 32 mmol/L Final     Carbon Dioxide (CO2)   Date Value Ref Range Status   10/29/2021 29 20 - 32 mmol/L Final     Anion Gap   Date Value Ref Range Status   10/29/2021 2 (L) 3 - 14 mmol/L Final   07/09/2021 6 3 - 14 mmol/L Final     Glucose   Date Value Ref Range Status   10/29/2021 107 (H) 70 - 99 mg/dL Final   07/09/2021 181 (H) 70 - 99 mg/dL Final     Urea Nitrogen   Date Value Ref Range Status   10/29/2021 10 7 - 30 mg/dL Final   07/09/2021 13 7 - 30 mg/dL Final     Creatinine   Date Value Ref Range Status   10/29/2021 0.66 0.52 - 1.04 mg/dL Final   07/09/2021 0.58 0.52 - 1.04 mg/dL Final     GFR Estimate   Date Value Ref Range Status   10/29/2021 >90 >60 mL/min/1.73m2 Final     Comment:     As of July 11, 2021, eGFR is calculated by the CKD-EPI creatinine equation, without race adjustment. eGFR can be influenced by muscle mass, exercise,  and diet. The reported eGFR is an estimation only and is only applicable if the renal function is stable.   07/09/2021 >90 >60 mL/min/[1.73_m2] Final     Comment:     Non  GFR Calc  Starting 12/18/2018, serum creatinine based estimated GFR (eGFR) will be   calculated using the Chronic Kidney Disease Epidemiology Collaboration   (CKD-EPI) equation.       Calcium   Date Value Ref Range Status   10/29/2021 9.1 8.5 - 10.1 mg/dL Final   07/09/2021 8.6 8.5 - 10.1 mg/dL Final     Bilirubin Total   Date Value Ref Range Status   10/29/2021 0.8 0.2 - 1.3 mg/dL Final   07/09/2021 0.5 0.2 - 1.3 mg/dL Final     Alkaline Phosphatase   Date Value Ref Range Status   10/29/2021 107 40 - 150 U/L Final   07/09/2021 100 40 - 150 U/L Final     ALT   Date Value Ref Range Status   10/29/2021 28 0 - 50 U/L Final   07/09/2021 24 0 - 50 U/L Final     AST   Date Value Ref Range Status   10/29/2021 24 0 - 45 U/L Final   07/09/2021 25 0 - 45 U/L Final       No results found for: WBC  No results found for: RBC  No results found for: HGB  No results found for: HCT  No results found for: MCV  No results found for: MCH  No results found for: MCHC  No results found for: RDW  No results found for: PLT    INR   Date Value Ref Range Status   10/29/2021 1.27 (H) 0.85 - 1.15 Final   07/09/2021 1.26 (H) 0.86 - 1.14 Final        MELD-Na score: 9 at 10/29/2021  9:15 AM  MELD score: 9 at 10/29/2021  9:15 AM  Calculated from:  Serum Creatinine: 0.66 mg/dL (Using min of 1 mg/dL) at 10/29/2021  9:15 AM  Serum Sodium: 141 mmol/L (Using max of 137 mmol/L) at 10/29/2021  9:15 AM  Total Bilirubin: 0.8 mg/dL (Using min of 1 mg/dL) at 10/29/2021  9:15 AM  INR(ratio): 1.27 at 10/29/2021  9:15 AM  Age: 66 years    Imaging:     RUQ US 10/29/2021    Liver: The liver demonstrates normal echogenicity with coarsened  echotexture and capsular nodularity, measuring 16.1 cm in craniocaudal  dimension. No focal hepatic mass.The main portal vein is patent  with  antegrade flow.    US visualization score: A - No or minimal limitations     Gallbladder: Relatively decompressed and mildly thickened gallbladder  with approximately 5 mm pedunculated nonshadowing intraluminal soft  tissue mass arising from the wall without internal sonographic blood  flow. No cholelithiasis.     Bile Ducts: Normal caliber intra and extrahepatic biliary tree. The  common bile duct measures 3 mm in diameter.     Pancreas: Visualized portions of the pancreas are unremarkable.      Kidney: The right kidney measures 11.8 cm in long dimension. Normal  parenchymal echogenicity and echotexture. No hydronephrosis.     Aorta and IVC: The visualized portions of the aorta and IVC are  unremarkable.      Fluid: Trace ascites in the right lower quadrant                                                                   IMPRESSION:  1. No focal liver lesion.    a. LI-RADS US Category: US-1 Negative: No US evidence of HCC    b. Recommend continued surveillance US.  2. Trace ascites in the right lower quadrant, presumed sequela of  portal hypertension.  3. Decompressed gallbladder with approximately 5 mm probable polyp, as  seen on outside imaging.       Endoscopy:    EGD 10/28/2021    Findings:        Grade II varices were found in the middle third of the esophagus and in        the lower third of the esophagus. Two bands were successfully placed        with incomplete eradication of varices. There was no bleeding during and        at the end of the procedure.        The exam of the esophagus was otherwise normal.        Moderate portal hypertensive gastropathy was found in the gastric        antrum. Potential nodular GAVE at the antrum.        The cardia and gastric fundus were normal on retroflexion. No gastric        varices        The examined duodenum was normal.                                                                                     Impression:          - Grade II esophageal varices.  Incompletely eradicated.                        Banded.                        - Portal hypertensive gastropathy.                        - Normal examined duodenum.                        - No specimens collected.     EGD 7/29    Findings:        Grade II varices were found in the lower third of the esophagus. Red        whales were present. Three bands were successfully placed with        incomplete eradication of varices. More proximal varices partially        deflated with distal banding, given overlying scarring did not band more        proximally. There was no bleeding at the end of the procedure.        The exam of the esophagus was otherwise normal.        Potential small gastric varix on the lesser curvature of the stomach.        Did not communicate with esophageal varices.        Moderate portal hypertensive gastropathy was found in the gastric antrum.        The examined duodenum was normal.                                                                                     Impression:          - Grade II esophageal varices. Incompletely eradicated.                        Banded.                        - Potential small gastric varix - not clearly varix but                        reoprted history of gastric varices.                        - Portal hypertensive gastropathy.                        - Normal examined duodenum.                        - No specimens collected.     EGD 5/12/2021  EV banding x 6  GVs no stigmata of bleeding    EGD 7/29   Grade 2 EVs s/p banding x 3    EGD 10/28   Grade 2 EVs s/p banding x2  PHG  ? Nodular GAVE    Colonoscopy 7/2021    Findings:        The digital rectal exam findings include decreased sphincter tone.        An area of moderately congested mucosa was found in the recto-sigmoid        colon and in the descending colon. Contact oozing was noted.        An area of mildly congested mucosa was found in the transverse colon, in        the ascending colon and in the  cecum.        A 2 mm polyp was found in the cecum. The polyp was sessile. The polyp        was removed with a jumbo cold forceps. Resection and retrieval were        complete.        Two sessile polyps were found in the transverse colon. The polyps were 1        to 2 mm in size. These polyps were removed with a jumbo cold forceps.        Resection and retrieval were complete.        Two sessile polyps were found in the rectum. The polyps were 1 to 2 mm        in size. These polyps were removed with a jumbo cold forceps. Resection        and retrieval were complete.        Multiple medium-sized diffuse angioectasias without bleeding were found        in the entire colon. Angioectasias more prevalent in right colon. Flat        erythematous areas also seen, thought to be dense angioectasias v. scope        trauma. More prevalent on right colon. Edge of larger flat erythematous        area biopsied. Biopsies were taken with a cold forceps for histology.        Large, non-bleeding rectal varices were found.        The exam was otherwise without abnormality.                                                                                     Impression:          - Decreased sphincter tone found on digital rectal exam.                        - Congested mucosa in the recto-sigmoid colon and in the                        descending colon.                        - Congested mucosa in the transverse colon, in the                        ascending colon and in the cecum.                        - One 2 mm polyp in the cecum, removed with a jumbo cold                        forceps. Resected and retrieved.                        - Two 1 to 2 mm polyps in the transverse colon, removed                        with a jumbo cold forceps. Resected and retrieved.                        - Two 1 to 2 mm polyps in the rectum, removed with a                        jumbo cold forceps. Resected and retrieved.                        - Multiple  "non-bleeding colonic angioectasias. Biopsied.                        - Rectal varices.                        - The examination was otherwise normal.     A. CECAL POLYP:  -Colonic mucosa with lymphoid aggregate  -No evidence of neoplastic polyp     B. COLON ERYTHEMA BIOPSIES:  -Colonic mucosa with no significant histopathologic abnormality  -No evidence of chronic active or microscopic colitis     C. TRANSVERSE COLON POLYPS X2:  -Colonic mucosa with no significant histopathologic abnormality  -No evidence of neoplastic polyp     D. RECTAL POLYPS X2:  -Hyperplastic polyp(s)    Independently reviewed labs and imaging.     Assessment/Plan: Ms. 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    She has a long history of DINH cirrhosis, recently decompensated for the first time with an apparent esophageal variceal bleed and new ascites. MELD is 9. Started on lasix, required small volume paracentesis 7/2021.     MELD-Na score: 9 at 10/29/2021  9:15 AM  MELD score: 9 at 10/29/2021  9:15 AM  Calculated from:  Serum Creatinine: 0.66 mg/dL (Using min of 1 mg/dL) at 10/29/2021  9:15 AM  Serum Sodium: 141 mmol/L (Using max of 137 mmol/L) at 10/29/2021  9:15 AM  Total Bilirubin: 0.8 mg/dL (Using min of 1 mg/dL) at 10/29/2021  9:15 AM  INR(ratio): 1.27 at 10/29/2021  9:15 AM  Age: 66 years    Given low MELD, transplant not pursued.  She has been undergoing regular EGD with banding.  She has a follow-up EGD scheduled for next month.  She is on onset of beta-blocker     If needed, I think she would be a good TIPS candidate, low MELD, TTE ok, and no history of HE.     - Continue lasix 20/spironolactone 25 mg daily for ascites  - Continue nadolol for secondary ppx of variceal bleeding - HR at goal.  Getting her next EGD soon in [place]  - HCC screening - RUQ US 10/2021 without masses, repeat May 2022   -Discussed with the stage " of her liver disease, do not see clear contraindications for knee surgery, and may have some issues with bleeding, and they should be aware of her underlying diagnosis.  They can reach out to me if there are any questions. Would recommend she complete banding prior to any elective surgery .     RTC 6 months.    Linda Burton MD MS  Hepatology/Liver Transplant  [facility]    GI Physician who saw her inpatient in [place] Dr. Shruthi Vargas     Video Visit    Start Time 10:59  End time 11:16    [place]   At home    Approximately 16 minutes was spent for the visit with 24 minutes of non face-to-face time were spent in review of the patient's medical record on the day of the visit. This included review of previous: clinic visits, hospital records, lab results, imaging studies, and documentation.  The findings from this review are summarized in the above note.    Chantelle is a 66 year old who is being evaluated via a billable video visit.      How would you like to obtain your AVS? MyChart  If the video visit is dropped, the invitation should be resent by: Text to cell phone: [phone]  Will anyone else be joining your video visit? No      Video Start Time: 10:59  Video-Visit Details  Type of service:  Video Visit  Video End Time:11:15  Originating Location (pt. Location): Home  Distant Location (provider location):  [facility] [place]   Platform used for Video Visit: Kopo Kopo

## 2022-01-24 NOTE — PROGRESS NOTES
"[facility] Return Patient Visit    Date of Visit: January 24th, 2022    Reason for referral: Consult for decompensated DINH cirrhosis    Subjective: Ms. 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 Lynn [facility]). Went on milk thistle and vitamin E. Reported some scarring on imaging, not sure if she was told she had cirrhosis, but underwent HCC screening every 6 months. Never had a liver bx.     She was in [place] May 2021, felt nauseated and developed hematemesis - underwent EGD that showed EVs that were banded x 6 and GVs that did not have stigmata of bleeding. She had a CT scan at that time - may have had ascites but not enough to tap. MELD apparently 15.     She returned back to [place].     Was started on lasix 20 mg daily a few weeks ago. She underwent her first paracentesis 7/7 - 2 L removed, she is not sure if there was more to remove. Not sent for diagnostic sampling.     EGD 7/2021 and 10/2021 with grade 2 EVs s/p banding.  On nadolol.     Interval Events:   -Had a follow-up EGD in [place] where she underwent banding x5.  She has upcoming EGD scheduled in a few weeks  -Doing well, her sister passed away on new years, she spent a lot of time taking care of her sister who is ill  -Ran out of her spironolactone in the fall, noticed her hernia was larger with this.  Has restarted low-dose Lasix and spironolactone is doing well  -No emergency room visits or hospitalizations  -No hepatic encephalopathy  -Returning to [place] soon, they may live there full-time  -She is hoping to get any surgery soon    ROS: 14 point ROS negative except for positives noted in HPI.    PMHx:  Past Medical History:   Diagnosis Date     Diabetes (H)      Hepatitis    DM on " insulin and metformin    PSHx:  C section   JACOB - tubes and ovaries removed, has her cervix left    FamHx:  Brother was a drink with cirrhosis, pancreatic cancer  No family history of liver disease, liver cancer    SocHx:  Social History     Socioeconomic History     Marital status:      Spouse name: Not on file     Number of children: Not on file     Years of education: Not on file     Highest education level: Not on file   Occupational History     Not on file   Tobacco Use     Smoking status: Never Smoker     Smokeless tobacco: Never Used   Substance and Sexual Activity     Alcohol use: Not Currently     Comment: Last drink was 30+ years ago     Drug use: Never     Sexual activity: Not on file   Other Topics Concern     Parent/sibling w/ CABG, MI or angioplasty before 65F 55M? Not Asked   Social History Narrative     Not on file     Social Determinants of Health     Financial Resource Strain: Not on file   Food Insecurity: Not on file   Transportation Needs: Not on file   Physical Activity: Not on file   Stress: Not on file   Social Connections: Not on file   Intimate Partner Violence: Not on file   Housing Stability: Not on file    lives with spouse.  Currently alternating between [place] and [place]  Rare alcohol use in the past    Medications:  Current Outpatient Medications   Medication     ACCU-CHEK GUIDE test strip     Blood Glucose Monitoring Suppl (ACCU-CHEK GUIDE) w/Device KIT     furosemide (LASIX) 20 MG tablet     insulin pen needle (B-D U/F) 31G X 5 MM miscellaneous     LANTUS SOLOSTAR 100 UNIT/ML soln     metFORMIN (GLUCOPHAGE) 1000 MG tablet     nadolol (CORGARD) 20 MG tablet     pantoprazole (PROTONIX) 40 MG EC tablet     spironolactone (ALDACTONE) 25 MG tablet     No current facility-administered medications for this visit.   Lasix 20 mg daily - started a few weeks ago for LE edema and abdominal swelling  PPI for GERD  Blood sugars ok, A1c good    Allergies:  Allergies   Allergen Reactions      Doxycycline Diarrhea       Objective:  There were no vitals taken for this visit.  Constitutional: pleasant woman in NAD  Eyes: non icteric  Respiratory: Normal respiratory excursion   MSK: normal range of motion of visualized extremities  Abd: Obese, minimal ascites present  Skin: No jaundice  Psychiatric: normal mood and orientation    Labs:  Last Comprehensive Metabolic Panel:  Sodium   Date Value Ref Range Status   10/29/2021 141 133 - 144 mmol/L Final   07/09/2021 139 133 - 144 mmol/L Final     Potassium   Date Value Ref Range Status   10/29/2021 4.4 3.4 - 5.3 mmol/L Final   07/09/2021 3.7 3.4 - 5.3 mmol/L Final     Chloride   Date Value Ref Range Status   10/29/2021 110 (H) 94 - 109 mmol/L Final   07/09/2021 107 94 - 109 mmol/L Final     Carbon Dioxide   Date Value Ref Range Status   07/09/2021 26 20 - 32 mmol/L Final     Carbon Dioxide (CO2)   Date Value Ref Range Status   10/29/2021 29 20 - 32 mmol/L Final     Anion Gap   Date Value Ref Range Status   10/29/2021 2 (L) 3 - 14 mmol/L Final   07/09/2021 6 3 - 14 mmol/L Final     Glucose   Date Value Ref Range Status   10/29/2021 107 (H) 70 - 99 mg/dL Final   07/09/2021 181 (H) 70 - 99 mg/dL Final     Urea Nitrogen   Date Value Ref Range Status   10/29/2021 10 7 - 30 mg/dL Final   07/09/2021 13 7 - 30 mg/dL Final     Creatinine   Date Value Ref Range Status   10/29/2021 0.66 0.52 - 1.04 mg/dL Final   07/09/2021 0.58 0.52 - 1.04 mg/dL Final     GFR Estimate   Date Value Ref Range Status   10/29/2021 >90 >60 mL/min/1.73m2 Final     Comment:     As of July 11, 2021, eGFR is calculated by the CKD-EPI creatinine equation, without race adjustment. eGFR can be influenced by muscle mass, exercise, and diet. The reported eGFR is an estimation only and is only applicable if the renal function is stable.   07/09/2021 >90 >60 mL/min/[1.73_m2] Final     Comment:     Non  GFR Calc  Starting 12/18/2018, serum creatinine based estimated GFR (eGFR) will be    calculated using the Chronic Kidney Disease Epidemiology Collaboration   (CKD-EPI) equation.       Calcium   Date Value Ref Range Status   10/29/2021 9.1 8.5 - 10.1 mg/dL Final   07/09/2021 8.6 8.5 - 10.1 mg/dL Final     Bilirubin Total   Date Value Ref Range Status   10/29/2021 0.8 0.2 - 1.3 mg/dL Final   07/09/2021 0.5 0.2 - 1.3 mg/dL Final     Alkaline Phosphatase   Date Value Ref Range Status   10/29/2021 107 40 - 150 U/L Final   07/09/2021 100 40 - 150 U/L Final     ALT   Date Value Ref Range Status   10/29/2021 28 0 - 50 U/L Final   07/09/2021 24 0 - 50 U/L Final     AST   Date Value Ref Range Status   10/29/2021 24 0 - 45 U/L Final   07/09/2021 25 0 - 45 U/L Final       No results found for: WBC  No results found for: RBC  No results found for: HGB  No results found for: HCT  No results found for: MCV  No results found for: MCH  No results found for: MCHC  No results found for: RDW  No results found for: PLT    INR   Date Value Ref Range Status   10/29/2021 1.27 (H) 0.85 - 1.15 Final   07/09/2021 1.26 (H) 0.86 - 1.14 Final        MELD-Na score: 9 at 10/29/2021  9:15 AM  MELD score: 9 at 10/29/2021  9:15 AM  Calculated from:  Serum Creatinine: 0.66 mg/dL (Using min of 1 mg/dL) at 10/29/2021  9:15 AM  Serum Sodium: 141 mmol/L (Using max of 137 mmol/L) at 10/29/2021  9:15 AM  Total Bilirubin: 0.8 mg/dL (Using min of 1 mg/dL) at 10/29/2021  9:15 AM  INR(ratio): 1.27 at 10/29/2021  9:15 AM  Age: 66 years    Imaging:     RUQ US 10/29/2021    Liver: The liver demonstrates normal echogenicity with coarsened  echotexture and capsular nodularity, measuring 16.1 cm in craniocaudal  dimension. No focal hepatic mass.The main portal vein is patent with  antegrade flow.    US visualization score: A - No or minimal limitations     Gallbladder: Relatively decompressed and mildly thickened gallbladder  with approximately 5 mm pedunculated nonshadowing intraluminal soft  tissue mass arising from the wall without internal  sonographic blood  flow. No cholelithiasis.     Bile Ducts: Normal caliber intra and extrahepatic biliary tree. The  common bile duct measures 3 mm in diameter.     Pancreas: Visualized portions of the pancreas are unremarkable.      Kidney: The right kidney measures 11.8 cm in long dimension. Normal  parenchymal echogenicity and echotexture. No hydronephrosis.     Aorta and IVC: The visualized portions of the aorta and IVC are  unremarkable.      Fluid: Trace ascites in the right lower quadrant                                                                   IMPRESSION:  1. No focal liver lesion.    a. LI-RADS US Category: US-1 Negative: No US evidence of HCC    b. Recommend continued surveillance US.  2. Trace ascites in the right lower quadrant, presumed sequela of  portal hypertension.  3. Decompressed gallbladder with approximately 5 mm probable polyp, as  seen on outside imaging.       Endoscopy:    EGD 10/28/2021    Findings:        Grade II varices were found in the middle third of the esophagus and in        the lower third of the esophagus. Two bands were successfully placed        with incomplete eradication of varices. There was no bleeding during and        at the end of the procedure.        The exam of the esophagus was otherwise normal.        Moderate portal hypertensive gastropathy was found in the gastric        antrum. Potential nodular GAVE at the antrum.        The cardia and gastric fundus were normal on retroflexion. No gastric        varices        The examined duodenum was normal.                                                                                     Impression:          - Grade II esophageal varices. Incompletely eradicated.                        Banded.                        - Portal hypertensive gastropathy.                        - Normal examined duodenum.                        - No specimens collected.     EGD 7/29    Findings:        Grade II varices were found in  the lower third of the esophagus. Red        whales were present. Three bands were successfully placed with        incomplete eradication of varices. More proximal varices partially        deflated with distal banding, given overlying scarring did not band more        proximally. There was no bleeding at the end of the procedure.        The exam of the esophagus was otherwise normal.        Potential small gastric varix on the lesser curvature of the stomach.        Did not communicate with esophageal varices.        Moderate portal hypertensive gastropathy was found in the gastric antrum.        The examined duodenum was normal.                                                                                     Impression:          - Grade II esophageal varices. Incompletely eradicated.                        Banded.                        - Potential small gastric varix - not clearly varix but                        reoprted history of gastric varices.                        - Portal hypertensive gastropathy.                        - Normal examined duodenum.                        - No specimens collected.     EGD 5/12/2021  EV banding x 6  GVs no stigmata of bleeding    EGD 7/29   Grade 2 EVs s/p banding x 3    EGD 10/28   Grade 2 EVs s/p banding x2  PHG  ? Nodular GAVE    Colonoscopy 7/2021    Findings:        The digital rectal exam findings include decreased sphincter tone.        An area of moderately congested mucosa was found in the recto-sigmoid        colon and in the descending colon. Contact oozing was noted.        An area of mildly congested mucosa was found in the transverse colon, in        the ascending colon and in the cecum.        A 2 mm polyp was found in the cecum. The polyp was sessile. The polyp        was removed with a jumbo cold forceps. Resection and retrieval were        complete.        Two sessile polyps were found in the transverse colon. The polyps were 1        to 2 mm in size.  These polyps were removed with a jumbo cold forceps.        Resection and retrieval were complete.        Two sessile polyps were found in the rectum. The polyps were 1 to 2 mm        in size. These polyps were removed with a jumbo cold forceps. Resection        and retrieval were complete.        Multiple medium-sized diffuse angioectasias without bleeding were found        in the entire colon. Angioectasias more prevalent in right colon. Flat        erythematous areas also seen, thought to be dense angioectasias v. scope        trauma. More prevalent on right colon. Edge of larger flat erythematous        area biopsied. Biopsies were taken with a cold forceps for histology.        Large, non-bleeding rectal varices were found.        The exam was otherwise without abnormality.                                                                                     Impression:          - Decreased sphincter tone found on digital rectal exam.                        - Congested mucosa in the recto-sigmoid colon and in the                        descending colon.                        - Congested mucosa in the transverse colon, in the                        ascending colon and in the cecum.                        - One 2 mm polyp in the cecum, removed with a jumbo cold                        forceps. Resected and retrieved.                        - Two 1 to 2 mm polyps in the transverse colon, removed                        with a jumbo cold forceps. Resected and retrieved.                        - Two 1 to 2 mm polyps in the rectum, removed with a                        jumbo cold forceps. Resected and retrieved.                        - Multiple non-bleeding colonic angioectasias. Biopsied.                        - Rectal varices.                        - The examination was otherwise normal.     A. CECAL POLYP:  -Colonic mucosa with lymphoid aggregate  -No evidence of neoplastic polyp     B. COLON ERYTHEMA  "BIOPSIES:  -Colonic mucosa with no significant histopathologic abnormality  -No evidence of chronic active or microscopic colitis     C. TRANSVERSE COLON POLYPS X2:  -Colonic mucosa with no significant histopathologic abnormality  -No evidence of neoplastic polyp     D. RECTAL POLYPS X2:  -Hyperplastic polyp(s)    Independently reviewed labs and imaging.     Assessment/Plan: Ms. 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    She has a long history of DINH cirrhosis, recently decompensated for the first time with an apparent esophageal variceal bleed and new ascites. MELD is 9. Started on lasix, required small volume paracentesis 7/2021.     MELD-Na score: 9 at 10/29/2021  9:15 AM  MELD score: 9 at 10/29/2021  9:15 AM  Calculated from:  Serum Creatinine: 0.66 mg/dL (Using min of 1 mg/dL) at 10/29/2021  9:15 AM  Serum Sodium: 141 mmol/L (Using max of 137 mmol/L) at 10/29/2021  9:15 AM  Total Bilirubin: 0.8 mg/dL (Using min of 1 mg/dL) at 10/29/2021  9:15 AM  INR(ratio): 1.27 at 10/29/2021  9:15 AM  Age: 66 years    Given low MELD, transplant not pursued.  She has been undergoing regular EGD with banding.  She has a follow-up EGD scheduled for next month.  She is on onset of beta-blocker     If needed, I think she would be a good TIPS candidate, low MELD, TTE ok, and no history of HE.     - Continue lasix 20/spironolactone 25 mg daily for ascites  - Continue nadolol for secondary ppx of variceal bleeding - HR at goal.  Getting her next EGD soon in [place]  - HCC screening - RUQ US 10/2021 without masses, repeat May 2022   -Discussed with the stage of her liver disease, do not see clear contraindications for knee surgery, and may have some issues with bleeding, and they should be aware of her underlying diagnosis.  They can reach out to me if there are any questions. Would recommend she complete banding prior " to any elective surgery .     RTC 6 months.    Linda Burton MD MS  Hepatology/Liver Transplant  [facility]    GI Physician who saw her inpatient in [place] Dr. Shruthi Vargas     Video Visit    Start Time 10:59  End time 11:16    [place]   At home    Approximately 16 minutes was spent for the visit with 24 minutes of non face-to-face time were spent in review of the patient's medical record on the day of the visit. This included review of previous: clinic visits, hospital records, lab results, imaging studies, and documentation.  The findings from this review are summarized in the above note.

## 2022-01-24 NOTE — PROGRESS NOTES
Chantelle is a 66 year old who is being evaluated via a billable video visit.      How would you like to obtain your AVS? MyChart  If the video visit is dropped, the invitation should be resent by: Text to cell phone: [phone]  Will anyone else be joining your video visit? No      Video Start Time: 10:59  Video-Visit Details    Type of service:  Video Visit    Video End Time:11:15    Originating Location (pt. Location): Home    Distant Location (provider location):  [facility] [place]     Platform used for Video Visit: Anhelo

## 2022-01-31 ENCOUNTER — ANCILLARY PROCEDURE (OUTPATIENT)
Dept: ULTRASOUND IMAGING | Facility: CLINIC | Age: 67
End: 2022-01-31
Attending: STUDENT IN AN ORGANIZED HEALTH CARE EDUCATION/TRAINING PROGRAM
Payer: COMMERCIAL

## 2022-01-31 DIAGNOSIS — K75.81 NASH (NONALCOHOLIC STEATOHEPATITIS): ICD-10-CM

## 2022-01-31 DIAGNOSIS — R18.8 CIRRHOSIS OF LIVER WITH ASCITES, UNSPECIFIED HEPATIC CIRRHOSIS TYPE (H): ICD-10-CM

## 2022-01-31 DIAGNOSIS — K74.60 CIRRHOSIS OF LIVER WITH ASCITES, UNSPECIFIED HEPATIC CIRRHOSIS TYPE (H): ICD-10-CM

## 2022-01-31 PROCEDURE — 76705 ECHO EXAM OF ABDOMEN: CPT | Mod: GC | Performed by: STUDENT IN AN ORGANIZED HEALTH CARE EDUCATION/TRAINING PROGRAM

## 2022-04-14 DIAGNOSIS — K75.81 NASH (NONALCOHOLIC STEATOHEPATITIS): Primary | ICD-10-CM

## 2022-05-29 ENCOUNTER — HEALTH MAINTENANCE LETTER (OUTPATIENT)
Age: 67
End: 2022-05-29

## 2022-07-24 ENCOUNTER — HEALTH MAINTENANCE LETTER (OUTPATIENT)
Age: 67
End: 2022-07-24

## 2022-07-26 ENCOUNTER — OFFICE VISIT (OUTPATIENT)
Dept: GASTROENTEROLOGY | Facility: CLINIC | Age: 67
End: 2022-07-26
Attending: STUDENT IN AN ORGANIZED HEALTH CARE EDUCATION/TRAINING PROGRAM
Payer: COMMERCIAL

## 2022-07-26 ENCOUNTER — ANCILLARY PROCEDURE (OUTPATIENT)
Dept: ULTRASOUND IMAGING | Facility: CLINIC | Age: 67
End: 2022-07-26
Attending: STUDENT IN AN ORGANIZED HEALTH CARE EDUCATION/TRAINING PROGRAM
Payer: COMMERCIAL

## 2022-07-26 VITALS
WEIGHT: 266.6 LBS | HEART RATE: 63 BPM | DIASTOLIC BLOOD PRESSURE: 53 MMHG | BODY MASS INDEX: 44.42 KG/M2 | SYSTOLIC BLOOD PRESSURE: 103 MMHG | HEIGHT: 65 IN | OXYGEN SATURATION: 96 %

## 2022-07-26 DIAGNOSIS — E11.9 CONTROLLED TYPE 2 DIABETES MELLITUS WITHOUT COMPLICATION, WITH LONG-TERM CURRENT USE OF INSULIN (H): Primary | ICD-10-CM

## 2022-07-26 DIAGNOSIS — K75.81 NASH (NONALCOHOLIC STEATOHEPATITIS): ICD-10-CM

## 2022-07-26 DIAGNOSIS — Z79.4 CONTROLLED TYPE 2 DIABETES MELLITUS WITHOUT COMPLICATION, WITH LONG-TERM CURRENT USE OF INSULIN (H): Primary | ICD-10-CM

## 2022-07-26 DIAGNOSIS — K74.60 CIRRHOSIS OF LIVER WITH ASCITES, UNSPECIFIED HEPATIC CIRRHOSIS TYPE (H): ICD-10-CM

## 2022-07-26 DIAGNOSIS — R18.8 CIRRHOSIS OF LIVER WITH ASCITES, UNSPECIFIED HEPATIC CIRRHOSIS TYPE (H): ICD-10-CM

## 2022-07-26 PROCEDURE — G0463 HOSPITAL OUTPT CLINIC VISIT: HCPCS | Performed by: STUDENT IN AN ORGANIZED HEALTH CARE EDUCATION/TRAINING PROGRAM

## 2022-07-26 PROCEDURE — 76705 ECHO EXAM OF ABDOMEN: CPT | Performed by: STUDENT IN AN ORGANIZED HEALTH CARE EDUCATION/TRAINING PROGRAM

## 2022-07-26 PROCEDURE — 99214 OFFICE O/P EST MOD 30 MIN: CPT | Performed by: STUDENT IN AN ORGANIZED HEALTH CARE EDUCATION/TRAINING PROGRAM

## 2022-07-26 RX ORDER — PANTOPRAZOLE SODIUM 40 MG/1
40 TABLET, DELAYED RELEASE ORAL
COMMUNITY
Start: 2021-11-12

## 2022-07-26 RX ORDER — NADOLOL 20 MG/1
20 TABLET ORAL
COMMUNITY
Start: 2021-10-26

## 2022-07-26 RX ORDER — LANCETS 23 GAUGE
EACH MISCELLANEOUS
COMMUNITY
Start: 2021-01-21

## 2022-07-26 RX ORDER — BLOOD-GLUCOSE METER
KIT MISCELLANEOUS
COMMUNITY
Start: 2021-01-21

## 2022-07-26 RX ORDER — SPIRONOLACTONE 25 MG/1
25 TABLET ORAL
COMMUNITY
Start: 2021-07-09

## 2022-07-26 RX ORDER — FUROSEMIDE 20 MG
20 TABLET ORAL
COMMUNITY
Start: 2022-04-26

## 2022-07-26 RX ORDER — CALCIUM CITRATE 1040MG
500 TABLET ORAL
COMMUNITY
Start: 2022-04-26

## 2022-07-26 RX ORDER — L. ACIDOPHILUS/BIFIDO. LONGUM 15 MG
CAPSULE,DELAYED RELEASE (ENTERIC COATED) ORAL
COMMUNITY
Start: 2022-03-23

## 2022-07-26 RX ORDER — TRIAMCINOLONE ACETONIDE 1 MG/G
CREAM TOPICAL
COMMUNITY
Start: 2022-04-26

## 2022-07-26 ASSESSMENT — PAIN SCALES - GENERAL: PAINLEVEL: NO PAIN (0)

## 2022-07-26 NOTE — CONFIDENTIAL NOTE
"[facility] Return Patient Visit    Date of Visit: July 26th, 2022    Reason for referral: Consult for decompensated DINH cirrhosis    Subjective: Ms. 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). Went on milk thistle and vitamin E. Reported some scarring on imaging, not sure if she was told she had cirrhosis, but underwent HCC screening every 6 months. Never had a liver bx.     She was in [place] May 2021, felt nauseated and developed hematemesis - underwent EGD that showed EVs that were banded x 6 and GVs that did not have stigmata of bleeding. She had a CT scan at that time - may have had ascites but not enough to tap. MELD apparently 15.     She returned back to [place].     Was started on lasix 20 mg daily Summer 2021. She underwent her first paracentesis 7/7 - 2 L removed, she is not sure if there was more to remove. Not sent for diagnostic sampling.     EGD 7/2021 and 10/2021 with grade 2 EVs s/p banding.  On nadolol at goal heart rate. Had a follow-up EGD in [place] where she underwent banding x5.      Interval Events: [place]  -Labs 4/2022 showed normal LFTs, renal function, AFP < 2, platelets 66  -Doing well, planning on moving to [place] full time soon with her . Son lives here, will come back for visits  -Taking diuretics, no issues with swelling  -Gained 10 lbs since her last visit  -No hepatic encephalopathy    ROS: 14 point ROS negative except for positives noted in HPI.    PMHx:  Past Medical History:   Diagnosis Date     Diabetes (H)      Hepatitis    DM on insulin and metformin    PSHx:  C section   JACOB - tubes and ovaries removed, has her cervix left    FamHx:  Brother was a drink with cirrhosis, pancreatic " cancer  No family history of liver disease, liver cancer    SocHx:  Social History     Socioeconomic History     Marital status:      Spouse name: Not on file     Number of children: Not on file     Years of education: Not on file     Highest education level: Not on file   Occupational History     Not on file   Tobacco Use     Smoking status: Never Smoker     Smokeless tobacco: Never Used   Substance and Sexual Activity     Alcohol use: Not Currently     Comment: Last drink was 30+ years ago     Drug use: Never     Sexual activity: Not on file   Other Topics Concern     Parent/sibling w/ CABG, MI or angioplasty before 65F 55M? Not Asked   Social History Narrative     Not on file     Social Determinants of Health     Financial Resource Strain: Not on file   Food Insecurity: Not on file   Transportation Needs: Not on file   Physical Activity: Not on file   Stress: Not on file   Social Connections: Not on file   Intimate Partner Violence: Not on file   Housing Stability: Not on file    lives with spouse.  Currently alternating between [place] and [place]  Rare alcohol use in the past    Medications:  Current Outpatient Medications   Medication     ACCU-CHEK GUIDE test strip     blood glucose ([employer] BLOOD GLUCOSE TEST) test strip     Blood Glucose Monitoring Suppl (ACCU-CHEK GUIDE) w/Device KIT     Blood Glucose Monitoring Suppl (ASSURE PRO BLOOD GLUCOSE METER) KAYLEY     Calcium Citrate 1040 MG TABS     cholecalciferol (VITAMIN D3) 25 mcg (1000 units) capsule     furosemide (LASIX) 20 MG tablet     furosemide (LASIX) 20 MG tablet     insulin pen needle (B-D U/F) 31G X 5 MM miscellaneous     lancets 28G MISC     LANTUS SOLOSTAR 100 UNIT/ML soln     metFORMIN (GLUCOPHAGE) 1000 MG tablet     metFORMIN (GLUCOPHAGE) 1000 MG tablet     nadolol (CORGARD) 20 MG tablet     nadolol (CORGARD) 20 MG tablet     pantoprazole (PROTONIX) 40 MG EC tablet     pantoprazole (PROTONIX) 40 MG EC tablet     spironolactone (ALDACTONE) 25  "MG tablet     spironolactone (ALDACTONE) 25 MG tablet     triamcinolone (KENALOG) 0.1 % external cream     No current facility-administered medications for this visit.   Lasix 20 mg daily - started a few weeks ago for LE edema and abdominal swelling  PPI for GERD  Blood sugars ok, A1c good    Allergies:  Allergies   Allergen Reactions     Doxycycline Diarrhea       Objective:  /53   Pulse 63   Ht 1.661 m (5' 5.4\")   Wt 120.9 kg (266 lb 9.6 oz)   SpO2 96%   BMI 43.82 kg/m    Constitutional: pleasant woman in NAD  Eyes: non icteric  Respiratory: Normal respiratory excursion   MSK: normal range of motion of visualized extremities  Abd: Obese, minimal ascites present  Skin: No jaundice  Psychiatric: normal mood and orientation    Labs:  Last Comprehensive Metabolic Panel:  Sodium   Date Value Ref Range Status   10/29/2021 141 133 - 144 mmol/L Final   07/09/2021 139 133 - 144 mmol/L Final     Potassium   Date Value Ref Range Status   10/29/2021 4.4 3.4 - 5.3 mmol/L Final   07/09/2021 3.7 3.4 - 5.3 mmol/L Final     Chloride   Date Value Ref Range Status   10/29/2021 110 (H) 94 - 109 mmol/L Final   07/09/2021 107 94 - 109 mmol/L Final     Carbon Dioxide   Date Value Ref Range Status   07/09/2021 26 20 - 32 mmol/L Final     Carbon Dioxide (CO2)   Date Value Ref Range Status   10/29/2021 29 20 - 32 mmol/L Final     Anion Gap   Date Value Ref Range Status   10/29/2021 2 (L) 3 - 14 mmol/L Final   07/09/2021 6 3 - 14 mmol/L Final     Glucose   Date Value Ref Range Status   10/29/2021 107 (H) 70 - 99 mg/dL Final   07/09/2021 181 (H) 70 - 99 mg/dL Final     Urea Nitrogen   Date Value Ref Range Status   10/29/2021 10 7 - 30 mg/dL Final   07/09/2021 13 7 - 30 mg/dL Final     Creatinine   Date Value Ref Range Status   10/29/2021 0.66 0.52 - 1.04 mg/dL Final   07/09/2021 0.58 0.52 - 1.04 mg/dL Final     GFR Estimate   Date Value Ref Range Status   10/29/2021 >90 >60 mL/min/1.73m2 Final     Comment:     As of July 11, 2021, " eGFR is calculated by the CKD-EPI creatinine equation, without race adjustment. eGFR can be influenced by muscle mass, exercise, and diet. The reported eGFR is an estimation only and is only applicable if the renal function is stable.   07/09/2021 >90 >60 mL/min/[1.73_m2] Final     Comment:     Non  GFR Calc  Starting 12/18/2018, serum creatinine based estimated GFR (eGFR) will be   calculated using the Chronic Kidney Disease Epidemiology Collaboration   (CKD-EPI) equation.       Calcium   Date Value Ref Range Status   10/29/2021 9.1 8.5 - 10.1 mg/dL Final   07/09/2021 8.6 8.5 - 10.1 mg/dL Final     Bilirubin Total   Date Value Ref Range Status   10/29/2021 0.8 0.2 - 1.3 mg/dL Final   07/09/2021 0.5 0.2 - 1.3 mg/dL Final     Alkaline Phosphatase   Date Value Ref Range Status   10/29/2021 107 40 - 150 U/L Final   07/09/2021 100 40 - 150 U/L Final     ALT   Date Value Ref Range Status   10/29/2021 28 0 - 50 U/L Final   07/09/2021 24 0 - 50 U/L Final     AST   Date Value Ref Range Status   10/29/2021 24 0 - 45 U/L Final   07/09/2021 25 0 - 45 U/L Final       No results found for: WBC  No results found for: RBC  No results found for: HGB  No results found for: HCT  No results found for: MCV  No results found for: MCH  No results found for: MCHC  No results found for: RDW  No results found for: PLT    INR   Date Value Ref Range Status   10/29/2021 1.27 (H) 0.85 - 1.15 Final   07/09/2021 1.26 (H) 0.86 - 1.14 Final      Imaging:     RUQ US 7/26/2022    IMPRESSION:   1. Cirrhosis without focal liver lesion.  a. LI-RADS US Category: US-1 Negative: No US evidence of HCC  b. Recommend continued surveillance US.  2. Probable 4 mm gallbladder polyp.    Endoscopy:    EGD 10/28/2021    Findings:        Grade II varices were found in the middle third of the esophagus and in        the lower third of the esophagus. Two bands were successfully placed        with incomplete eradication of varices. There was no bleeding  during and        at the end of the procedure.        The exam of the esophagus was otherwise normal.        Moderate portal hypertensive gastropathy was found in the gastric        antrum. Potential nodular GAVE at the antrum.        The cardia and gastric fundus were normal on retroflexion. No gastric        varices        The examined duodenum was normal.                                                                                     Impression:          - Grade II esophageal varices. Incompletely eradicated.                        Banded.                        - Portal hypertensive gastropathy.                        - Normal examined duodenum.                        - No specimens collected.     EGD 7/29    Findings:        Grade II varices were found in the lower third of the esophagus. Red        whales were present. Three bands were successfully placed with        incomplete eradication of varices. More proximal varices partially        deflated with distal banding, given overlying scarring did not band more        proximally. There was no bleeding at the end of the procedure.        The exam of the esophagus was otherwise normal.        Potential small gastric varix on the lesser curvature of the stomach.        Did not communicate with esophageal varices.        Moderate portal hypertensive gastropathy was found in the gastric antrum.        The examined duodenum was normal.                                                                                     Impression:          - Grade II esophageal varices. Incompletely eradicated.                        Banded.                        - Potential small gastric varix - not clearly varix but                        reoprted history of gastric varices.                        - Portal hypertensive gastropathy.                        - Normal examined duodenum.                        - No specimens collected.     EGD 5/12/2021  EV banding x 6  GVs no  stigmata of bleeding    EGD 7/29   Grade 2 EVs s/p banding x 3    EGD 10/28   Grade 2 EVs s/p banding x2  PHG  ? Nodular GAVE    Colonoscopy 7/2021    Findings:        The digital rectal exam findings include decreased sphincter tone.        An area of moderately congested mucosa was found in the recto-sigmoid        colon and in the descending colon. Contact oozing was noted.        An area of mildly congested mucosa was found in the transverse colon, in        the ascending colon and in the cecum.        A 2 mm polyp was found in the cecum. The polyp was sessile. The polyp        was removed with a jumbo cold forceps. Resection and retrieval were        complete.        Two sessile polyps were found in the transverse colon. The polyps were 1        to 2 mm in size. These polyps were removed with a jumbo cold forceps.        Resection and retrieval were complete.        Two sessile polyps were found in the rectum. The polyps were 1 to 2 mm        in size. These polyps were removed with a jumbo cold forceps. Resection        and retrieval were complete.        Multiple medium-sized diffuse angioectasias without bleeding were found        in the entire colon. Angioectasias more prevalent in right colon. Flat        erythematous areas also seen, thought to be dense angioectasias v. scope        trauma. More prevalent on right colon. Edge of larger flat erythematous        area biopsied. Biopsies were taken with a cold forceps for histology.        Large, non-bleeding rectal varices were found.        The exam was otherwise without abnormality.                                                                                     Impression:          - Decreased sphincter tone found on digital rectal exam.                        - Congested mucosa in the recto-sigmoid colon and in the                        descending colon.                        - Congested mucosa in the transverse colon, in the                        " ascending colon and in the cecum.                        - One 2 mm polyp in the cecum, removed with a jumbo cold                        forceps. Resected and retrieved.                        - Two 1 to 2 mm polyps in the transverse colon, removed                        with a jumbo cold forceps. Resected and retrieved.                        - Two 1 to 2 mm polyps in the rectum, removed with a                        jumbo cold forceps. Resected and retrieved.                        - Multiple non-bleeding colonic angioectasias. Biopsied.                        - Rectal varices.                        - The examination was otherwise normal.     A. CECAL POLYP:  -Colonic mucosa with lymphoid aggregate  -No evidence of neoplastic polyp     B. COLON ERYTHEMA BIOPSIES:  -Colonic mucosa with no significant histopathologic abnormality  -No evidence of chronic active or microscopic colitis     C. TRANSVERSE COLON POLYPS X2:  -Colonic mucosa with no significant histopathologic abnormality  -No evidence of neoplastic polyp     D. RECTAL POLYPS X2:  -Hyperplastic polyp(s)    Independently reviewed labs and imaging.     Assessment/Plan: Ms. 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    She has a long history of DINH cirrhosis, recently decompensated for the first time with an apparent esophageal variceal bleed and new ascites. MELD is <10. Started on lasix, required small volume paracentesis 7/2021.     MELD-Na score: 9 at 10/29/2021  9:15 AM  MELD score: 9 at 10/29/2021  9:15 AM  Calculated from:  Serum Creatinine: 0.66 mg/dL (Using min of 1 mg/dL) at 10/29/2021  9:15 AM  Serum Sodium: 141 mmol/L (Using max of 137 mmol/L) at 10/29/2021  9:15 AM  Total Bilirubin: 0.8 mg/dL (Using min of 1 mg/dL) at 10/29/2021  9:15 AM  INR(ratio): 1.27 at 10/29/2021  9:15 AM  Age: 66 years    Given low MELD, transplant evaluation " not pursued.  Discussed the basics of evaluation, she should work to lose weight for potentially candidacy in the future if her liver worsens. She is moving to [place] full time, should follow with her gastroenterologist in [place] and can see a transplant provider there if needed. Discussed if her liver disease were to worsen and she wanted to start an evaluation here (instead of [place]) she would need to live her for a year afterwards for post transplant care.     I would not recommend a TIPS procedure now or any IR intervention for her gastric varices as they have not bled. If they bled (or if she has a recurrent esophageal variceal bleed), I would recommend pursuing TIPS, BRTO, IR intervention. If she developed refractory ascites she would be a good TIPS candidate (low MELD, TTE ok, and no history of HE).     - Continue lasix 20/spironolactone 25 mg daily for ascites  - Continue nadolol for secondary ppx of variceal bleeding - HR at goal.  Follow up EGDs wit provider in [place]  - HCC screening - AFP normal 4/2022, RUQ US  Normal 7/2022  -Discussed with the stage of her liver disease, do not see clear contraindications for knee surgery, and may have some issues with bleeding, and they should be aware of her underlying diagnosis.  They can reach out to me if there are any questions.     RTC PRN - moving to [place]    Linda Burton MD MS  Hepatology/Liver Transplant  [facility]    Approximately 20 minutes was spent for the visit with 10 minutes of non face-to-face time were spent in review of the patient's medical record on the day of the visit. This included review of previous: clinic visits, hospital records, lab results, imaging studies, and documentation.  The findings from this review are summarized in the above note.

## 2022-07-26 NOTE — LETTER
7/26/2022         RE: Chantelle Sutton  [address] [place] [address]        Dear Colleague,    Thank you for referring your patient, Chantelle Sutton, to the [facility] [place]. Please see a copy of my visit note below.    No notes on file    Again, thank you for allowing me to participate in the care of your patient.        Sincerely,        Linda Burton MD

## 2022-07-26 NOTE — LETTER
7/26/2022       RE: Chantelle Sutton  [address] [place] [address]     Dear Colleague,    Thank you for referring your patient, Chantelle Sutton, to the [facility]. Please see a copy of my visit note below.    No notes on file    Again, thank you for allowing me to participate in the care of your patient.      Sincerely,    Linda Burton MD

## 2022-07-26 NOTE — NURSING NOTE
"Vital signs:      BP: 103/53 Pulse: 63     SpO2: 96 %     Height: 166.1 cm (5' 5.4\") Weight: 120.9 kg (266 lb 9.6 oz)  Estimated body mass index is 43.82 kg/m  as calculated from the following:    Height as of this encounter: 1.661 m (5' 5.4\").    Weight as of this encounter: 120.9 kg (266 lb 9.6 oz).        .sgin  Chief Complaint   Patient presents with     RECHECK     Follow up       "

## 2022-10-03 ENCOUNTER — HEALTH MAINTENANCE LETTER (OUTPATIENT)
Age: 67
End: 2022-10-03

## 2023-02-11 ENCOUNTER — HEALTH MAINTENANCE LETTER (OUTPATIENT)
Age: 68
End: 2023-02-11

## 2023-05-20 ENCOUNTER — HEALTH MAINTENANCE LETTER (OUTPATIENT)
Age: 68
End: 2023-05-20

## 2023-08-12 ENCOUNTER — HEALTH MAINTENANCE LETTER (OUTPATIENT)
Age: 68
End: 2023-08-12

## 2023-10-21 ENCOUNTER — HEALTH MAINTENANCE LETTER (OUTPATIENT)
Age: 68
End: 2023-10-21

## 2024-03-09 ENCOUNTER — HEALTH MAINTENANCE LETTER (OUTPATIENT)
Age: 69
End: 2024-03-09

## 2024-07-27 ENCOUNTER — HEALTH MAINTENANCE LETTER (OUTPATIENT)
Age: 69
End: 2024-07-27

## 2024-10-22 NOTE — TELEPHONE ENCOUNTER
Sedation has been switched to MAC.     No further action needed at this time. Will close encounter.     Lashanda Nix RN     DISCHARGE

## 2024-12-14 ENCOUNTER — HEALTH MAINTENANCE LETTER (OUTPATIENT)
Age: 69
End: 2024-12-14

## 2025-03-16 ENCOUNTER — HEALTH MAINTENANCE LETTER (OUTPATIENT)
Age: 70
End: 2025-03-16

## 2025-06-29 ENCOUNTER — HEALTH MAINTENANCE LETTER (OUTPATIENT)
Age: 70
End: 2025-06-29

## 2025-08-10 ENCOUNTER — HEALTH MAINTENANCE LETTER (OUTPATIENT)
Age: 70
End: 2025-08-10

## (undated) DEVICE — SOL WATER IRRIG 500ML BOTTLE 2F7113

## (undated) DEVICE — SUCTION MANIFOLD NEPTUNE 2 SYS 1 PORT 702-025-000

## (undated) DEVICE — LIGATOR SPEEDBAND SUPERVIEW 7 BAND LATEX FREE 4225

## (undated) DEVICE — ENDO FORCEP SPIKED SERRATED SHAFT JUMBO 239CM G56998

## (undated) DEVICE — ENDO BITE BLOCK ADULT OMNI-BLOC

## (undated) DEVICE — SYR 30ML SLIP TIP W/O NDL 302833

## (undated) DEVICE — ENDO FORCEP BX CAPTURA PRO SPIKE G50696

## (undated) DEVICE — KIT ENDO TURNOVER/PROCEDURE CARRY-ON 101822

## (undated) DEVICE — TUBING SUCTION 12"X1/4" N612

## (undated) DEVICE — GOWN IMPERVIOUS 2XL BLUE

## (undated) DEVICE — GLOVE EXAM NITRILE LG PF LATEX FREE 5064

## (undated) DEVICE — SUCTION CATH AIRLIFE TRI-FLO W/CONTROL PORT 14FR  T60C

## (undated) DEVICE — SPECIMEN CONTAINER 3OZ W/FORMALIN 59901